# Patient Record
Sex: FEMALE | Race: WHITE
[De-identification: names, ages, dates, MRNs, and addresses within clinical notes are randomized per-mention and may not be internally consistent; named-entity substitution may affect disease eponyms.]

---

## 2019-09-03 ENCOUNTER — HOSPITAL ENCOUNTER (OUTPATIENT)
Dept: HOSPITAL 95 - LAB SRC | Age: 69
Discharge: HOME | End: 2019-09-03
Attending: NURSE PRACTITIONER
Payer: COMMERCIAL

## 2019-09-03 DIAGNOSIS — R31.9: Primary | ICD-10-CM

## 2019-09-03 LAB
LEUKOCYTE ESTERASE UR QL STRIP: (no result)
PROT UR STRIP-MCNC: (no result) MG/DL
RBC #/AREA URNS HPF: (no result) /HPF
SP GR SPEC: 1.01
UROBILINOGEN UR STRIP-MCNC: (no result) MG/DL

## 2019-10-25 ENCOUNTER — HOSPITAL ENCOUNTER (OUTPATIENT)
Dept: HOSPITAL 95 - LAB SHORT | Age: 69
Discharge: HOME | End: 2019-10-25
Attending: NURSE PRACTITIONER
Payer: COMMERCIAL

## 2019-10-25 DIAGNOSIS — R82.998: Primary | ICD-10-CM

## 2019-10-25 LAB
LEUKOCYTE ESTERASE UR QL STRIP: (no result)
RBC #/AREA URNS HPF: (no result) /HPF (ref 0–2)
SP GR SPEC: 1 (ref 1–1.02)
UROBILINOGEN UR STRIP-MCNC: (no result) MG/DL
WBC #/AREA URNS HPF: (no result) /HPF (ref 0–5)

## 2020-02-07 ENCOUNTER — HOSPITAL ENCOUNTER (OUTPATIENT)
Dept: HOSPITAL 95 - LAB SHORT | Age: 70
Discharge: HOME | End: 2020-02-07
Attending: FAMILY MEDICINE
Payer: COMMERCIAL

## 2020-02-07 DIAGNOSIS — R35.0: Primary | ICD-10-CM

## 2020-02-07 DIAGNOSIS — R82.998: ICD-10-CM

## 2020-02-07 LAB
SP GR SPEC: 1.02 (ref 1–1.02)
UROBILINOGEN UR STRIP-MCNC: (no result) MG/DL

## 2020-04-13 ENCOUNTER — HOSPITAL ENCOUNTER (OUTPATIENT)
Dept: HOSPITAL 95 - ORSCMMR | Age: 70
Discharge: HOME | End: 2020-04-13
Attending: SURGERY
Payer: COMMERCIAL

## 2020-04-13 VITALS — WEIGHT: 104.72 LBS | BODY MASS INDEX: 20.56 KG/M2 | HEIGHT: 60 IN

## 2020-04-13 DIAGNOSIS — C77.3: Primary | ICD-10-CM

## 2020-04-13 DIAGNOSIS — Z79.899: ICD-10-CM

## 2020-04-13 DIAGNOSIS — Z85.3: ICD-10-CM

## 2020-04-13 PROCEDURE — 07B60ZX EXCISION OF LEFT AXILLARY LYMPHATIC, OPEN APPROACH, DIAGNOSTIC: ICD-10-PCS | Performed by: SURGERY

## 2020-05-11 ENCOUNTER — HOSPITAL ENCOUNTER (OUTPATIENT)
Dept: HOSPITAL 95 - ORSCMMR | Age: 70
Discharge: HOME | End: 2020-05-11
Attending: SURGERY
Payer: COMMERCIAL

## 2020-05-11 VITALS — WEIGHT: 105.82 LBS | HEIGHT: 60 IN | BODY MASS INDEX: 20.78 KG/M2

## 2020-05-11 DIAGNOSIS — G47.33: ICD-10-CM

## 2020-05-11 DIAGNOSIS — E78.00: ICD-10-CM

## 2020-05-11 DIAGNOSIS — C77.9: Primary | ICD-10-CM

## 2020-05-11 PROCEDURE — 0JH60WZ INSERTION OF TOTALLY IMPLANTABLE VASCULAR ACCESS DEVICE INTO CHEST SUBCUTANEOUS TISSUE AND FASCIA, OPEN APPROACH: ICD-10-PCS | Performed by: SURGERY

## 2020-05-11 PROCEDURE — C1788 PORT, INDWELLING, IMP: HCPCS

## 2020-05-11 NOTE — NUR
DISCHARGE
Patient up to Ambulate independently. Gait steady.
Discharge instructions reviewed with patient. Patient verbalizes understanding.
Copy given to patient to take home.
Dressing to procedure site clean, dry, intact with no visible
drainage, swelling, erythema or bruising noted.
Patient States Post-Procedure ride home has been arranged.
Discharged via wheelchair to private car for ride home.
PT STATES PAIN IS GETTING A LITTLE BETTER PT DENIES N/V HARD COPY OF RX GIVEN
TO PT

## 2020-05-26 ENCOUNTER — HOSPITAL ENCOUNTER (OUTPATIENT)
Dept: HOSPITAL 95 - LAB | Age: 70
Discharge: HOME | End: 2020-05-26
Attending: INTERNAL MEDICINE
Payer: COMMERCIAL

## 2020-05-26 DIAGNOSIS — C50.919: Primary | ICD-10-CM

## 2020-05-26 LAB
BASOPHILS # BLD: 0 K/MM3 (ref 0–0.23)
BASOPHILS NFR BLD: 0 % (ref 0–2)
DEPRECATED RDW RBC AUTO: 43.9 FL (ref 35.1–46.3)
EOSINOPHIL # BLD: 0 K/MM3 (ref 0–0.68)
EOSINOPHIL NFR BLD: 0 % (ref 0–6)
ERYTHROCYTE [DISTWIDTH] IN BLOOD BY AUTOMATED COUNT: 12.8 % (ref 11.7–14.2)
HCT VFR BLD AUTO: 33.1 % (ref 33–51)
HGB BLD-MCNC: 10.7 G/DL (ref 11.5–16)
LYMPHOCYTES # BLD: 0.44 K/MM3 (ref 0.84–5.2)
LYMPHOCYTES NFR BLD: 2 % (ref 21–46)
MCHC RBC AUTO-ENTMCNC: 32.3 G/DL (ref 31.5–36.5)
MCV RBC AUTO: 96 FL (ref 80–100)
METAMYELOCYTES # BLD MANUAL: 0.44 K/MM3 (ref 0–0)
METAMYELOCYTES NFR BLD MANUAL: 2 % (ref 0–0)
MONOCYTES # BLD: 0.66 K/MM3 (ref 0.16–1.47)
MONOCYTES NFR BLD: 3 % (ref 4–13)
MYELOCYTES # BLD MANUAL: 0.44 K/MM3 (ref 0–0)
MYELOCYTES NFR BLD MANUAL: 2 % (ref 0–0)
NEUTS BAND NFR BLD MANUAL: 14 % (ref 0–8)
NEUTS SEG # BLD MANUAL: 20.11 K/MM3 (ref 1.96–9.15)
NEUTS SEG NFR BLD MANUAL: 77 % (ref 41–73)
NRBC # BLD AUTO: 0.08 K/MM3 (ref 0–0.02)
NRBC BLD AUTO-RTO: 0.4 /100 WBC (ref 0–0.2)
PLATELET # BLD AUTO: 249 K/MM3 (ref 150–400)
TOTAL CELLS COUNTED BLD: 100

## 2020-06-17 ENCOUNTER — HOSPITAL ENCOUNTER (OUTPATIENT)
Dept: HOSPITAL 95 - ORSCMMR | Age: 70
Discharge: HOME | End: 2020-06-17
Attending: SURGERY
Payer: COMMERCIAL

## 2020-06-17 VITALS — HEIGHT: 59.02 IN | WEIGHT: 103.4 LBS | BODY MASS INDEX: 20.84 KG/M2

## 2020-06-17 DIAGNOSIS — G47.33: ICD-10-CM

## 2020-06-17 DIAGNOSIS — T82.524A: Primary | ICD-10-CM

## 2020-06-17 DIAGNOSIS — Z85.3: ICD-10-CM

## 2020-06-17 DIAGNOSIS — Z79.899: ICD-10-CM

## 2020-06-17 PROCEDURE — B5131ZA FLUOROSCOPY OF RIGHT JUGULAR VEINS USING LOW OSMOLAR CONTRAST, GUIDANCE: ICD-10-PCS | Performed by: SURGERY

## 2020-06-17 PROCEDURE — 05HM33Z INSERTION OF INFUSION DEVICE INTO RIGHT INTERNAL JUGULAR VEIN, PERCUTANEOUS APPROACH: ICD-10-PCS | Performed by: SURGERY

## 2020-06-17 PROCEDURE — C1788 PORT, INDWELLING, IMP: HCPCS

## 2020-06-17 NOTE — NUR
Ambulatory in Day Surgery.
Surgical site prepped with 2% Chlorhexidine cloth wipe.
History, Chart, Medications and Allergies reviewed before start of
procedure. Lungs clear T/O to Auscultation.
Patient confirms NPO status and agrees with scheduled surgery.
Patient reports completing Chlorhexadine shower X2 prior to admission to
hospital. Pre-Op teaching done. Pt verbalizes understanding.
Patient States Post-Procedure ride home has been arranged.

## 2020-06-17 NOTE — NUR
Dressing to procedure site clean, dry, intact with no visible
drainage, swelling, erythema or bruising noted.
TOLERATED PO WELL.
Patient States Post-Procedure ride home has been arranged.
Discharge instructions reviewed with patient. Patient verbalizes understanding.
Copy given to patient to take home.

## 2020-10-06 ENCOUNTER — HOSPITAL ENCOUNTER (OUTPATIENT)
Dept: HOSPITAL 95 - LAB SHORT | Age: 70
End: 2020-10-06
Attending: PHYSICIAN ASSISTANT
Payer: COMMERCIAL

## 2020-10-06 DIAGNOSIS — L82.1: ICD-10-CM

## 2020-10-06 DIAGNOSIS — D36.12: ICD-10-CM

## 2020-10-06 DIAGNOSIS — D22.61: ICD-10-CM

## 2020-10-06 DIAGNOSIS — D22.72: ICD-10-CM

## 2020-10-06 DIAGNOSIS — D18.01: ICD-10-CM

## 2020-10-06 DIAGNOSIS — D22.5: ICD-10-CM

## 2020-10-06 DIAGNOSIS — L81.4: ICD-10-CM

## 2020-10-06 DIAGNOSIS — L60.9: ICD-10-CM

## 2020-10-06 DIAGNOSIS — Z80.8: ICD-10-CM

## 2020-10-06 DIAGNOSIS — D22.62: ICD-10-CM

## 2020-10-06 DIAGNOSIS — L08.9: Primary | ICD-10-CM

## 2020-10-06 DIAGNOSIS — D22.71: ICD-10-CM

## 2020-10-06 DIAGNOSIS — D36.14: ICD-10-CM

## 2020-10-06 DIAGNOSIS — Z71.89: ICD-10-CM

## 2021-07-02 ENCOUNTER — HOSPITAL ENCOUNTER (OUTPATIENT)
Dept: HOSPITAL 95 - LAB SHORT | Age: 71
Discharge: HOME | End: 2021-07-02
Attending: NURSE PRACTITIONER
Payer: COMMERCIAL

## 2021-07-02 DIAGNOSIS — R19.7: Primary | ICD-10-CM

## 2021-07-02 LAB — C DIFF TOX GENS STL QL NAA+PROBE: NEGATIVE

## 2021-07-27 ENCOUNTER — HOSPITAL ENCOUNTER (OUTPATIENT)
Dept: HOSPITAL 95 - LAB SHORT | Age: 71
End: 2021-07-27
Attending: FAMILY MEDICINE
Payer: COMMERCIAL

## 2021-07-27 DIAGNOSIS — N39.0: Primary | ICD-10-CM

## 2022-03-14 ENCOUNTER — HOSPITAL ENCOUNTER (EMERGENCY)
Dept: HOSPITAL 95 - ER | Age: 72
Discharge: HOME | End: 2022-03-14
Payer: COMMERCIAL

## 2022-03-14 VITALS — HEIGHT: 60 IN | WEIGHT: 105.01 LBS | BODY MASS INDEX: 20.62 KG/M2

## 2022-03-14 DIAGNOSIS — C50.812: ICD-10-CM

## 2022-03-14 DIAGNOSIS — Z20.822: ICD-10-CM

## 2022-03-14 DIAGNOSIS — J90: Primary | ICD-10-CM

## 2022-03-14 DIAGNOSIS — Z88.8: ICD-10-CM

## 2022-03-14 DIAGNOSIS — C50.912: ICD-10-CM

## 2022-03-14 DIAGNOSIS — Z88.5: ICD-10-CM

## 2022-03-14 LAB
FLUAV RNA SPEC QL NAA+PROBE: NEGATIVE
FLUBV RNA SPEC QL NAA+PROBE: NEGATIVE
RSV RNA SPEC QL NAA+PROBE: NEGATIVE
SARS-COV-2 RNA RESP QL NAA+PROBE: NEGATIVE

## 2022-03-14 PROCEDURE — A9270 NON-COVERED ITEM OR SERVICE: HCPCS

## 2022-04-22 ENCOUNTER — HOSPITAL ENCOUNTER (EMERGENCY)
Dept: HOSPITAL 95 - ER | Age: 72
Discharge: HOME | End: 2022-04-22
Payer: COMMERCIAL

## 2022-04-22 VITALS — BODY MASS INDEX: 20.03 KG/M2 | HEIGHT: 60 IN | WEIGHT: 102.01 LBS

## 2022-04-22 DIAGNOSIS — C34.90: ICD-10-CM

## 2022-04-22 DIAGNOSIS — C50.919: ICD-10-CM

## 2022-04-22 DIAGNOSIS — Z79.899: ICD-10-CM

## 2022-04-22 DIAGNOSIS — Z88.5: ICD-10-CM

## 2022-04-22 DIAGNOSIS — Z20.822: ICD-10-CM

## 2022-04-22 DIAGNOSIS — Z88.6: ICD-10-CM

## 2022-04-22 DIAGNOSIS — J90: Primary | ICD-10-CM

## 2022-04-22 LAB
ALBUMIN SERPL BCP-MCNC: 2.7 G/DL (ref 3.4–5)
ALBUMIN/GLOB SERPL: 0.8 {RATIO} (ref 0.8–1.8)
ALT SERPL W P-5'-P-CCNC: 21 U/L (ref 12–78)
ANION GAP SERPL CALCULATED.4IONS-SCNC: 6 MMOL/L (ref 6–16)
AST SERPL W P-5'-P-CCNC: 16 U/L (ref 12–37)
BASOPHILS # BLD: 0.01 K/MM3 (ref 0–0.23)
BASOPHILS NFR BLD: 1 % (ref 0–2)
BILIRUB SERPL-MCNC: 0.6 MG/DL (ref 0.1–1)
BUN SERPL-MCNC: 13 MG/DL (ref 8–24)
CALCIUM SERPL-MCNC: 9 MG/DL (ref 8.5–10.1)
CHLORIDE SERPL-SCNC: 104 MMOL/L (ref 98–108)
CO2 SERPL-SCNC: 28 MMOL/L (ref 21–32)
CREAT SERPL-MCNC: 0.81 MG/DL (ref 0.4–1)
DEPRECATED RDW RBC AUTO: 71 FL (ref 35.1–46.3)
EOSINOPHIL # BLD: 0 K/MM3 (ref 0–0.68)
EOSINOPHIL NFR BLD: 0 % (ref 0–6)
ERYTHROCYTE [DISTWIDTH] IN BLOOD BY AUTOMATED COUNT: 20.6 % (ref 11.7–14.2)
FLUAV RNA SPEC QL NAA+PROBE: NEGATIVE
FLUBV RNA SPEC QL NAA+PROBE: NEGATIVE
GLOBULIN SER CALC-MCNC: 3.6 G/DL (ref 2.2–4)
GLUCOSE SERPL-MCNC: 88 MG/DL (ref 70–99)
HCT VFR BLD AUTO: 33.1 % (ref 33–51)
HGB BLD-MCNC: 11.6 G/DL (ref 11.5–16)
LYMPHOCYTES # BLD: 0.76 K/MM3 (ref 0.84–5.2)
LYMPHOCYTES NFR BLD: 40 % (ref 21–46)
MCHC RBC AUTO-ENTMCNC: 35 G/DL (ref 31.5–36.5)
MCV RBC AUTO: 100 FL (ref 80–100)
MONOCYTES # BLD: 0.03 K/MM3 (ref 0.16–1.47)
MONOCYTES NFR BLD: 2 % (ref 4–13)
NEUTS BAND NFR BLD MANUAL: 1 % (ref 0–8)
NEUTS SEG # BLD MANUAL: 1.08 K/MM3 (ref 1.96–9.15)
NEUTS SEG NFR BLD MANUAL: 56 % (ref 41–73)
NRBC # BLD AUTO: 0 K/MM3 (ref 0–0.02)
NRBC BLD AUTO-RTO: 0 /100 WBC (ref 0–0.2)
PLATELET # BLD AUTO: 196 K/MM3 (ref 150–400)
POTASSIUM SERPL-SCNC: 4 MMOL/L (ref 3.5–5.5)
PROT SERPL-MCNC: 6.3 G/DL (ref 6.4–8.2)
PROTHROMBIN TIME: 10.9 SEC (ref 9.7–11.5)
RSV RNA SPEC QL NAA+PROBE: NEGATIVE
SARS-COV-2 RNA RESP QL NAA+PROBE: NEGATIVE
SODIUM SERPL-SCNC: 138 MMOL/L (ref 136–145)
TOTAL CELLS COUNTED BLD: 100

## 2022-04-26 ENCOUNTER — HOSPITAL ENCOUNTER (OUTPATIENT)
Dept: HOSPITAL 95 - LAB SHORT | Age: 72
Discharge: HOME | End: 2022-04-26
Attending: FAMILY MEDICINE
Payer: COMMERCIAL

## 2022-04-26 DIAGNOSIS — R30.9: ICD-10-CM

## 2022-04-26 DIAGNOSIS — M54.50: Primary | ICD-10-CM

## 2022-05-12 ENCOUNTER — HOSPITAL ENCOUNTER (OUTPATIENT)
Dept: HOSPITAL 95 - ORSCMMR | Age: 72
Discharge: HOME | End: 2022-05-12
Attending: SURGERY
Payer: COMMERCIAL

## 2022-05-12 VITALS — WEIGHT: 99.87 LBS | HEIGHT: 60 IN | BODY MASS INDEX: 19.61 KG/M2

## 2022-05-12 DIAGNOSIS — E78.00: ICD-10-CM

## 2022-05-12 DIAGNOSIS — J91.0: ICD-10-CM

## 2022-05-12 DIAGNOSIS — Z78.9: ICD-10-CM

## 2022-05-12 DIAGNOSIS — G47.33: ICD-10-CM

## 2022-05-12 DIAGNOSIS — Z99.81: ICD-10-CM

## 2022-05-12 DIAGNOSIS — C50.912: Primary | ICD-10-CM

## 2022-05-12 DIAGNOSIS — Z79.899: ICD-10-CM

## 2022-05-12 PROCEDURE — 0JH60WZ INSERTION OF TOTALLY IMPLANTABLE VASCULAR ACCESS DEVICE INTO CHEST SUBCUTANEOUS TISSUE AND FASCIA, OPEN APPROACH: ICD-10-PCS | Performed by: SURGERY

## 2022-05-12 PROCEDURE — C1729 CATH, DRAINAGE: HCPCS

## 2022-05-12 PROCEDURE — C1788 PORT, INDWELLING, IMP: HCPCS

## 2022-05-12 PROCEDURE — 0W9B30Z DRAINAGE OF LEFT PLEURAL CAVITY WITH DRAINAGE DEVICE, PERCUTANEOUS APPROACH: ICD-10-PCS | Performed by: SURGERY

## 2022-05-12 NOTE — NUR
Pt in OR getting a pleurex drain placed. Pt had left lung pleurex placed.
Leela realys more shortness of breath and difficulty getting in for
treatment. Review of pleurex drain with  and pt. Pt does not have home
health set up. Review of all three agencies and the need for early intake. [facility]
would like [facility] and if not available who ever come. She has a friend who used
to work for [facility].
  [facility]ed [facility] they are not taking appointments at this time. [facility] is
three weeks out. Called dr sue office to review and son [facility] program is
ok. Review of pt with [facility] intake nurse. If pt fails this last medication she
will need hospice. [facility] will see pt for symptom managment and pleurex
managment.
  Advised that if [facility] cannot manage need to schedule appointment at infusion
clinic and Staff from clinic will drain pt. And we will repeat teaching. will
follow up with pt and  packet faxed. Advised Dr resendiz office to fax
information they will need to provide perscription for volume and frequency
since pt not seen at surgeons office.

## 2022-05-12 NOTE — NUR
PT AXOX4, ABLE TO REPOSITION SELF IN BED. REQUESTING PO FLUIDS AND FOOD. PT
STATES 1/10 ACHING PAIN IN R NECK OVER INCISION SITE. PATIENT HAS THREE
INCISION SITES, ONE ON NECK THAT IS COVERED WITH TWO STERI STRIPS THAT ARE DRY
AND INTACT WITH SCANT AMOUNT OF DRY SANGUINEOUS MATERIAL. ONE DRESSING OVER
SECOND PROCEDURE SITE (MEDIPORT) ON RIGHT CHEST THAT IS 2X2 GAUZE COVERED WITH
CLEAR WINDOW TAPE AND IT IS CLEAN, DRY AND INTACT. ONE MORE INCISION SITE IS
ON LEFT ABDOMEN FOR PLEURX CATHETER, HAS FOAM DRESSING WITH WINDOW TAPE AND IS
CLEAN, DRY AND INTACT.

## 2022-05-12 NOTE — NUR
PT IS RESTING COMFORTABLY IN BED TOLERATING PO FLUIDS AND FOOD. PT CAME TO
STEPDOWN ON 4 L O2 NASAL CANNULA. OXYGEN SATS SUSTAIN IN MID 90'S WHILE
PATIENT HAS NASAL CANNULA ON, DROPS TO MID 80'S IF SHE REMOVES IT AFTER ONE
MINUTE.

## 2022-05-12 NOTE — NUR
PT RESTING IN BED, VISITING WITH  AT BEDSIDE. PT IS AXOX4, ABLE TO
REPOSITION IN BED. PATIENTS LUNGS ARE CLEAR BILATERALLY, LEFT LUNG IS
DIMINISHED THROUGHOUT.

## 2022-05-24 ENCOUNTER — HOSPITAL ENCOUNTER (OUTPATIENT)
Dept: HOSPITAL 95 - ORSCSDS | Age: 72
Discharge: HOME | End: 2022-05-24
Attending: SURGERY
Payer: COMMERCIAL

## 2022-05-24 VITALS — WEIGHT: 98.55 LBS | BODY MASS INDEX: 19.35 KG/M2 | HEIGHT: 60 IN

## 2022-05-24 DIAGNOSIS — J91.0: ICD-10-CM

## 2022-05-24 DIAGNOSIS — C50.919: Primary | ICD-10-CM

## 2022-05-24 DIAGNOSIS — Z79.899: ICD-10-CM

## 2022-05-24 DIAGNOSIS — G47.33: ICD-10-CM

## 2022-05-24 DIAGNOSIS — Z99.81: ICD-10-CM

## 2022-05-24 PROCEDURE — 0W9B30Z DRAINAGE OF LEFT PLEURAL CAVITY WITH DRAINAGE DEVICE, PERCUTANEOUS APPROACH: ICD-10-PCS | Performed by: SURGERY

## 2022-05-24 PROCEDURE — 0W9930Z DRAINAGE OF RIGHT PLEURAL CAVITY WITH DRAINAGE DEVICE, PERCUTANEOUS APPROACH: ICD-10-PCS | Performed by: SURGERY

## 2022-05-24 PROCEDURE — 0WPB30Z REMOVAL OF DRAINAGE DEVICE FROM LEFT PLEURAL CAVITY, PERCUTANEOUS APPROACH: ICD-10-PCS | Performed by: SURGERY

## 2022-05-24 PROCEDURE — C1729 CATH, DRAINAGE: HCPCS

## 2022-05-24 NOTE — NUR
05/24/22 1240 Malka Simpson
PT IN THE RECLINER WITH LEGS ELEVATED.  AT CHAIRSIDE. VSS. PT
DENIES PAIN AND NAUSEA AT THIS TIME. PT TOLERATING PO FLUIDS WELL.
CARE TURNED OVER TO NURSE ALONZO.

## 2022-05-24 NOTE — NUR
05/24/22 Juan Manuel9 Phyllis Slade
LEFT CATH SIDE DRESSED AND INTACT TO LEFT ABDOMEN.  DRESSING REMOVED
AND SITE PREPPED PER SURGEON FOR REPOSITIONING.  SITE WITHOUT S/S OF
INFECTION.

## 2022-05-31 ENCOUNTER — HOSPITAL ENCOUNTER (OUTPATIENT)
Dept: HOSPITAL 95 - LAB SHORT | Age: 72
End: 2022-05-31
Attending: INTERNAL MEDICINE
Payer: COMMERCIAL

## 2022-05-31 DIAGNOSIS — C50.919: Primary | ICD-10-CM

## 2022-05-31 LAB
ALBUMIN SERPL BCP-MCNC: 2.1 G/DL (ref 3.4–5)
ALBUMIN/GLOB SERPL: 0.6 {RATIO} (ref 0.8–1.8)
ALT SERPL W P-5'-P-CCNC: 17 U/L (ref 12–78)
ANION GAP SERPL CALCULATED.4IONS-SCNC: 15 MMOL/L (ref 6–16)
AST SERPL W P-5'-P-CCNC: 21 U/L (ref 12–37)
BASOPHILS # BLD: 0.03 K/MM3 (ref 0–0.23)
BASOPHILS NFR BLD: 1 % (ref 0–2)
BILIRUB SERPL-MCNC: 0.7 MG/DL (ref 0.1–1)
BUN SERPL-MCNC: 15 MG/DL (ref 8–24)
CALCIUM SERPL-MCNC: 8.7 MG/DL (ref 8.5–10.1)
CHLORIDE SERPL-SCNC: 100 MMOL/L (ref 98–108)
CO2 SERPL-SCNC: 20 MMOL/L (ref 21–32)
CREAT SERPL-MCNC: 0.61 MG/DL (ref 0.4–1)
DEPRECATED RDW RBC AUTO: 60.3 FL (ref 35.1–46.3)
EOSINOPHIL # BLD: 0.03 K/MM3 (ref 0–0.68)
EOSINOPHIL NFR BLD: 1 % (ref 0–6)
ERYTHROCYTE [DISTWIDTH] IN BLOOD BY AUTOMATED COUNT: 15.5 % (ref 11.7–14.2)
GLOBULIN SER CALC-MCNC: 3.3 G/DL (ref 2.2–4)
GLUCOSE SERPL-MCNC: 119 MG/DL (ref 70–99)
HCT VFR BLD AUTO: 23.1 % (ref 33–51)
HGB BLD-MCNC: 7.9 G/DL (ref 11.5–16)
LYMPHOCYTES # BLD: 0.82 K/MM3 (ref 0.84–5.2)
LYMPHOCYTES NFR BLD: 25 % (ref 21–46)
MCHC RBC AUTO-ENTMCNC: 34.2 G/DL (ref 31.5–36.5)
MCV RBC AUTO: 106 FL (ref 80–100)
MONOCYTES # BLD: 0.32 K/MM3 (ref 0.16–1.47)
MONOCYTES NFR BLD: 10 % (ref 4–13)
NEUTS BAND NFR BLD MANUAL: 8 % (ref 0–8)
NEUTS SEG # BLD MANUAL: 2.07 K/MM3 (ref 1.96–9.15)
NEUTS SEG NFR BLD MANUAL: 55 % (ref 41–73)
NRBC # BLD AUTO: 0 K/MM3 (ref 0–0.02)
NRBC BLD AUTO-RTO: 0 /100 WBC (ref 0–0.2)
PLATELET # BLD AUTO: 209 K/MM3 (ref 150–400)
POTASSIUM SERPL-SCNC: 3.3 MMOL/L (ref 3.5–5.5)
PROT SERPL-MCNC: 5.4 G/DL (ref 6.4–8.2)
SODIUM SERPL-SCNC: 135 MMOL/L (ref 136–145)
TOTAL CELLS COUNTED BLD: 100

## 2022-06-07 ENCOUNTER — HOSPITAL ENCOUNTER (OUTPATIENT)
Dept: HOSPITAL 95 - ATC | Age: 72
Discharge: HOME | End: 2022-06-07
Attending: INTERNAL MEDICINE
Payer: COMMERCIAL

## 2022-06-07 DIAGNOSIS — C77.3: ICD-10-CM

## 2022-06-07 DIAGNOSIS — C77.1: ICD-10-CM

## 2022-06-07 DIAGNOSIS — C50.912: Primary | ICD-10-CM

## 2022-06-07 DIAGNOSIS — Z45.2: ICD-10-CM

## 2022-06-07 DIAGNOSIS — C79.70: ICD-10-CM

## 2022-06-07 LAB
ALBUMIN SERPL BCP-MCNC: 2 G/DL (ref 3.4–5)
ALBUMIN/GLOB SERPL: 0.6 {RATIO} (ref 0.8–1.8)
ALT SERPL W P-5'-P-CCNC: 15 U/L (ref 12–78)
ANION GAP SERPL CALCULATED.4IONS-SCNC: 12 MMOL/L (ref 6–16)
AST SERPL W P-5'-P-CCNC: 19 U/L (ref 12–37)
BASOPHILS # BLD AUTO: 0.07 K/MM3 (ref 0–0.23)
BASOPHILS NFR BLD AUTO: 1 % (ref 0–2)
BILIRUB SERPL-MCNC: 0.4 MG/DL (ref 0.1–1)
BUN SERPL-MCNC: 12 MG/DL (ref 8–24)
CALCIUM SERPL-MCNC: 8.8 MG/DL (ref 8.5–10.1)
CHLORIDE SERPL-SCNC: 100 MMOL/L (ref 98–108)
CO2 SERPL-SCNC: 23 MMOL/L (ref 21–32)
CREAT SERPL-MCNC: 0.49 MG/DL (ref 0.4–1)
DEPRECATED RDW RBC AUTO: 59.4 FL (ref 35.1–46.3)
EOSINOPHIL # BLD AUTO: 0.03 K/MM3 (ref 0–0.68)
EOSINOPHIL NFR BLD AUTO: 1 % (ref 0–6)
ERYTHROCYTE [DISTWIDTH] IN BLOOD BY AUTOMATED COUNT: 15.2 % (ref 11.7–14.2)
GLOBULIN SER CALC-MCNC: 3.3 G/DL (ref 2.2–4)
GLUCOSE SERPL-MCNC: 88 MG/DL (ref 70–99)
HCT VFR BLD AUTO: 33.5 % (ref 33–51)
HGB BLD-MCNC: 11.3 G/DL (ref 11.5–16)
IMM GRANULOCYTES # BLD AUTO: 0.07 K/MM3 (ref 0–0.1)
IMM GRANULOCYTES NFR BLD AUTO: 1 % (ref 0–1)
LYMPHOCYTES # BLD AUTO: 0.79 K/MM3 (ref 0.84–5.2)
LYMPHOCYTES NFR BLD AUTO: 16 % (ref 21–46)
MCHC RBC AUTO-ENTMCNC: 33.7 G/DL (ref 31.5–36.5)
MCV RBC AUTO: 107 FL (ref 80–100)
MONOCYTES # BLD AUTO: 0.45 K/MM3 (ref 0.16–1.47)
MONOCYTES NFR BLD AUTO: 9 % (ref 4–13)
NEUTROPHILS # BLD AUTO: 3.66 K/MM3 (ref 1.96–9.15)
NEUTROPHILS NFR BLD AUTO: 72 % (ref 41–73)
NRBC # BLD AUTO: 0 K/MM3 (ref 0–0.02)
NRBC BLD AUTO-RTO: 0 /100 WBC (ref 0–0.2)
PLATELET # BLD AUTO: 236 K/MM3 (ref 150–400)
POTASSIUM SERPL-SCNC: 3.9 MMOL/L (ref 3.5–5.5)
PROT SERPL-MCNC: 5.3 G/DL (ref 6.4–8.2)
SODIUM SERPL-SCNC: 135 MMOL/L (ref 136–145)

## 2022-06-20 ENCOUNTER — HOSPITAL ENCOUNTER (OUTPATIENT)
Dept: HOSPITAL 95 - ATC | Age: 72
Discharge: HOME | End: 2022-06-20
Attending: INTERNAL MEDICINE
Payer: COMMERCIAL

## 2022-06-20 DIAGNOSIS — Z17.0: ICD-10-CM

## 2022-06-20 DIAGNOSIS — Z88.5: ICD-10-CM

## 2022-06-20 DIAGNOSIS — Z88.6: ICD-10-CM

## 2022-06-20 DIAGNOSIS — M81.0: ICD-10-CM

## 2022-06-20 DIAGNOSIS — C79.70: ICD-10-CM

## 2022-06-20 DIAGNOSIS — C50.912: Primary | ICD-10-CM

## 2022-06-20 DIAGNOSIS — C77.1: ICD-10-CM

## 2022-06-20 DIAGNOSIS — C77.3: ICD-10-CM

## 2022-06-20 LAB
ALBUMIN SERPL BCP-MCNC: 1.9 G/DL (ref 3.4–5)
ALBUMIN/GLOB SERPL: 0.6 {RATIO} (ref 0.8–1.8)
ALT SERPL W P-5'-P-CCNC: 13 U/L (ref 12–78)
ANION GAP SERPL CALCULATED.4IONS-SCNC: 8 MMOL/L (ref 6–16)
AST SERPL W P-5'-P-CCNC: 13 U/L (ref 12–37)
BASOPHILS # BLD AUTO: 0.01 K/MM3 (ref 0–0.23)
BASOPHILS NFR BLD AUTO: 1 % (ref 0–2)
BILIRUB SERPL-MCNC: 0.4 MG/DL (ref 0.1–1)
BUN SERPL-MCNC: 11 MG/DL (ref 8–24)
CALCIUM SERPL-MCNC: 8.3 MG/DL (ref 8.5–10.1)
CHLORIDE SERPL-SCNC: 104 MMOL/L (ref 98–108)
CO2 SERPL-SCNC: 27 MMOL/L (ref 21–32)
CREAT SERPL-MCNC: 0.41 MG/DL (ref 0.4–1)
DEPRECATED RDW RBC AUTO: 55.5 FL (ref 35.1–46.3)
EOSINOPHIL # BLD AUTO: 0.02 K/MM3 (ref 0–0.68)
EOSINOPHIL NFR BLD AUTO: 1 % (ref 0–6)
ERYTHROCYTE [DISTWIDTH] IN BLOOD BY AUTOMATED COUNT: 14.5 % (ref 11.7–14.2)
GLOBULIN SER CALC-MCNC: 3.4 G/DL (ref 2.2–4)
GLUCOSE SERPL-MCNC: 114 MG/DL (ref 70–99)
HCT VFR BLD AUTO: 28.8 % (ref 33–51)
HGB BLD-MCNC: 9.7 G/DL (ref 11.5–16)
IMM GRANULOCYTES # BLD AUTO: 0 K/MM3 (ref 0–0.1)
IMM GRANULOCYTES NFR BLD AUTO: 0 % (ref 0–1)
LYMPHOCYTES # BLD AUTO: 0.72 K/MM3 (ref 0.84–5.2)
LYMPHOCYTES NFR BLD AUTO: 46 % (ref 21–46)
MCHC RBC AUTO-ENTMCNC: 33.7 G/DL (ref 31.5–36.5)
MCV RBC AUTO: 104 FL (ref 80–100)
MONOCYTES # BLD AUTO: 0.34 K/MM3 (ref 0.16–1.47)
MONOCYTES NFR BLD AUTO: 22 % (ref 4–13)
NEUTROPHILS # BLD AUTO: 0.47 K/MM3 (ref 1.96–9.15)
NEUTROPHILS NFR BLD AUTO: 30 % (ref 41–73)
NRBC # BLD AUTO: 0 K/MM3 (ref 0–0.02)
NRBC BLD AUTO-RTO: 0 /100 WBC (ref 0–0.2)
PLATELET # BLD AUTO: 148 K/MM3 (ref 150–400)
POTASSIUM SERPL-SCNC: 3.4 MMOL/L (ref 3.5–5.5)
PROT SERPL-MCNC: 5.3 G/DL (ref 6.4–8.2)
SODIUM SERPL-SCNC: 139 MMOL/L (ref 136–145)

## 2022-06-27 ENCOUNTER — HOSPITAL ENCOUNTER (OUTPATIENT)
Dept: HOSPITAL 95 - ATC | Age: 72
Discharge: HOME | End: 2022-06-27
Attending: INTERNAL MEDICINE
Payer: COMMERCIAL

## 2022-06-27 DIAGNOSIS — C50.912: Primary | ICD-10-CM

## 2022-06-27 LAB
ALBUMIN SERPL BCP-MCNC: 1.8 G/DL (ref 3.4–5)
ALBUMIN/GLOB SERPL: 0.6 {RATIO} (ref 0.8–1.8)
ALT SERPL W P-5'-P-CCNC: 8 U/L (ref 12–78)
ANION GAP SERPL CALCULATED.4IONS-SCNC: 6 MMOL/L (ref 6–16)
AST SERPL W P-5'-P-CCNC: 13 U/L (ref 12–37)
BASOPHILS # BLD AUTO: 0.03 K/MM3 (ref 0–0.23)
BASOPHILS NFR BLD AUTO: 0 % (ref 0–2)
BILIRUB SERPL-MCNC: 0.3 MG/DL (ref 0.1–1)
BUN SERPL-MCNC: 9 MG/DL (ref 8–24)
CALCIUM SERPL-MCNC: 8.2 MG/DL (ref 8.5–10.1)
CHLORIDE SERPL-SCNC: 104 MMOL/L (ref 98–108)
CO2 SERPL-SCNC: 27 MMOL/L (ref 21–32)
CREAT SERPL-MCNC: 0.38 MG/DL (ref 0.4–1)
DEPRECATED RDW RBC AUTO: 58.1 FL (ref 35.1–46.3)
EOSINOPHIL # BLD AUTO: 0.02 K/MM3 (ref 0–0.68)
EOSINOPHIL NFR BLD AUTO: 0 % (ref 0–6)
ERYTHROCYTE [DISTWIDTH] IN BLOOD BY AUTOMATED COUNT: 15 % (ref 11.7–14.2)
GLOBULIN SER CALC-MCNC: 3.1 G/DL (ref 2.2–4)
GLUCOSE SERPL-MCNC: 116 MG/DL (ref 70–99)
HCT VFR BLD AUTO: 29 % (ref 33–51)
HGB BLD-MCNC: 9.8 G/DL (ref 11.5–16)
IMM GRANULOCYTES # BLD AUTO: 0.09 K/MM3 (ref 0–0.1)
IMM GRANULOCYTES NFR BLD AUTO: 1 % (ref 0–1)
LYMPHOCYTES # BLD AUTO: 0.88 K/MM3 (ref 0.84–5.2)
LYMPHOCYTES NFR BLD AUTO: 11 % (ref 21–46)
MCHC RBC AUTO-ENTMCNC: 33.8 G/DL (ref 31.5–36.5)
MCV RBC AUTO: 104 FL (ref 80–100)
MONOCYTES # BLD AUTO: 0.72 K/MM3 (ref 0.16–1.47)
MONOCYTES NFR BLD AUTO: 9 % (ref 4–13)
NEUTROPHILS # BLD AUTO: 5.95 K/MM3 (ref 1.96–9.15)
NEUTROPHILS NFR BLD AUTO: 77 % (ref 41–73)
NRBC # BLD AUTO: 0 K/MM3 (ref 0–0.02)
NRBC BLD AUTO-RTO: 0 /100 WBC (ref 0–0.2)
PLATELET # BLD AUTO: 142 K/MM3 (ref 150–400)
POTASSIUM SERPL-SCNC: 3.3 MMOL/L (ref 3.5–5.5)
PROT SERPL-MCNC: 4.9 G/DL (ref 6.4–8.2)
SODIUM SERPL-SCNC: 137 MMOL/L (ref 136–145)

## 2022-07-11 ENCOUNTER — HOSPITAL ENCOUNTER (OUTPATIENT)
Dept: HOSPITAL 95 - ATC | Age: 72
Discharge: HOME | End: 2022-07-11
Attending: INTERNAL MEDICINE
Payer: COMMERCIAL

## 2022-07-11 DIAGNOSIS — C50.912: Primary | ICD-10-CM

## 2022-07-11 LAB
ALBUMIN SERPL BCP-MCNC: 1.9 G/DL (ref 3.4–5)
ALBUMIN/GLOB SERPL: 0.6 {RATIO} (ref 0.8–1.8)
ALT SERPL W P-5'-P-CCNC: 15 U/L (ref 12–78)
ANION GAP SERPL CALCULATED.4IONS-SCNC: 5 MMOL/L (ref 6–16)
AST SERPL W P-5'-P-CCNC: 19 U/L (ref 12–37)
BASOPHILS # BLD: 0.14 K/MM3 (ref 0–0.23)
BASOPHILS NFR BLD: 1 % (ref 0–2)
BILIRUB SERPL-MCNC: 0.2 MG/DL (ref 0.1–1)
BUN SERPL-MCNC: 9 MG/DL (ref 8–24)
CALCIUM SERPL-MCNC: 8.6 MG/DL (ref 8.5–10.1)
CHLORIDE SERPL-SCNC: 107 MMOL/L (ref 98–108)
CO2 SERPL-SCNC: 28 MMOL/L (ref 21–32)
CREAT SERPL-MCNC: 0.38 MG/DL (ref 0.4–1)
DEPRECATED RDW RBC AUTO: 62.4 FL (ref 35.1–46.3)
EOSINOPHIL # BLD: 0 K/MM3 (ref 0–0.68)
EOSINOPHIL NFR BLD: 0 % (ref 0–6)
ERYTHROCYTE [DISTWIDTH] IN BLOOD BY AUTOMATED COUNT: 16.2 % (ref 11.7–14.2)
GLOBULIN SER CALC-MCNC: 3.1 G/DL (ref 2.2–4)
GLUCOSE SERPL-MCNC: 118 MG/DL (ref 70–99)
HCT VFR BLD AUTO: 26.2 % (ref 33–51)
HGB BLD-MCNC: 8.4 G/DL (ref 11.5–16)
LYMPHOCYTES # BLD: 0.44 K/MM3 (ref 0.84–5.2)
LYMPHOCYTES NFR BLD: 3 % (ref 21–46)
MCHC RBC AUTO-ENTMCNC: 32.1 G/DL (ref 31.5–36.5)
MCV RBC AUTO: 106 FL (ref 80–100)
MONOCYTES # BLD: 0.59 K/MM3 (ref 0.16–1.47)
MONOCYTES NFR BLD: 4 % (ref 4–13)
MYELOCYTES # BLD MANUAL: 0.14 K/MM3 (ref 0–0)
MYELOCYTES NFR BLD MANUAL: 1 % (ref 0–0)
NEUTS BAND NFR BLD MANUAL: 10 % (ref 0–8)
NEUTS SEG # BLD MANUAL: 13.63 K/MM3 (ref 1.96–9.15)
NEUTS SEG NFR BLD MANUAL: 81 % (ref 41–73)
NRBC # BLD AUTO: 0.03 K/MM3 (ref 0–0.02)
NRBC BLD AUTO-RTO: 0.2 /100 WBC (ref 0–0.2)
PLATELET # BLD AUTO: 162 K/MM3 (ref 150–400)
POTASSIUM SERPL-SCNC: 3.7 MMOL/L (ref 3.5–5.5)
PROT SERPL-MCNC: 5 G/DL (ref 6.4–8.2)
SODIUM SERPL-SCNC: 140 MMOL/L (ref 136–145)
TOTAL CELLS COUNTED BLD: 100

## 2022-07-19 ENCOUNTER — HOSPITAL ENCOUNTER (INPATIENT)
Dept: HOSPITAL 95 - ER | Age: 72
LOS: 6 days | Discharge: HOME HEALTH | DRG: 314 | End: 2022-07-25
Attending: INTERNAL MEDICINE | Admitting: HOSPITALIST
Payer: COMMERCIAL

## 2022-07-19 VITALS — BODY MASS INDEX: 18.27 KG/M2 | WEIGHT: 93.04 LBS | HEIGHT: 60 IN

## 2022-07-19 DIAGNOSIS — C78.02: ICD-10-CM

## 2022-07-19 DIAGNOSIS — K44.9: ICD-10-CM

## 2022-07-19 DIAGNOSIS — M48.061: ICD-10-CM

## 2022-07-19 DIAGNOSIS — T82.7XXA: Primary | ICD-10-CM

## 2022-07-19 DIAGNOSIS — Z95.828: ICD-10-CM

## 2022-07-19 DIAGNOSIS — G92.8: ICD-10-CM

## 2022-07-19 DIAGNOSIS — C50.912: ICD-10-CM

## 2022-07-19 DIAGNOSIS — Q85.00: ICD-10-CM

## 2022-07-19 DIAGNOSIS — Z79.899: ICD-10-CM

## 2022-07-19 DIAGNOSIS — Z90.12: ICD-10-CM

## 2022-07-19 DIAGNOSIS — D63.8: ICD-10-CM

## 2022-07-19 DIAGNOSIS — Z88.5: ICD-10-CM

## 2022-07-19 DIAGNOSIS — D69.6: ICD-10-CM

## 2022-07-19 DIAGNOSIS — Z79.891: ICD-10-CM

## 2022-07-19 DIAGNOSIS — M81.0: ICD-10-CM

## 2022-07-19 DIAGNOSIS — I31.3: ICD-10-CM

## 2022-07-19 DIAGNOSIS — Z85.72: ICD-10-CM

## 2022-07-19 DIAGNOSIS — J18.9: ICD-10-CM

## 2022-07-19 DIAGNOSIS — E43: ICD-10-CM

## 2022-07-19 DIAGNOSIS — A41.1: ICD-10-CM

## 2022-07-19 DIAGNOSIS — Z88.8: ICD-10-CM

## 2022-07-19 DIAGNOSIS — J43.9: ICD-10-CM

## 2022-07-19 DIAGNOSIS — C78.01: ICD-10-CM

## 2022-07-19 DIAGNOSIS — G47.30: ICD-10-CM

## 2022-07-19 DIAGNOSIS — Z20.822: ICD-10-CM

## 2022-07-19 DIAGNOSIS — Z85.028: ICD-10-CM

## 2022-07-19 DIAGNOSIS — G89.29: ICD-10-CM

## 2022-07-19 DIAGNOSIS — J91.0: ICD-10-CM

## 2022-07-19 DIAGNOSIS — M54.50: ICD-10-CM

## 2022-07-19 DIAGNOSIS — A41.02: ICD-10-CM

## 2022-07-19 DIAGNOSIS — Z92.21: ICD-10-CM

## 2022-07-19 DIAGNOSIS — E87.1: ICD-10-CM

## 2022-07-19 DIAGNOSIS — Z79.52: ICD-10-CM

## 2022-07-19 DIAGNOSIS — M41.86: ICD-10-CM

## 2022-07-19 DIAGNOSIS — Z98.890: ICD-10-CM

## 2022-07-19 DIAGNOSIS — Z66: ICD-10-CM

## 2022-07-19 DIAGNOSIS — E78.00: ICD-10-CM

## 2022-07-19 LAB
ALBUMIN SERPL BCP-MCNC: 2.3 G/DL (ref 3.4–5)
ALBUMIN/GLOB SERPL: 0.5 {RATIO} (ref 0.8–1.8)
ALT SERPL W P-5'-P-CCNC: 14 U/L (ref 12–78)
ANION GAP SERPL CALCULATED.4IONS-SCNC: 9 MMOL/L (ref 6–16)
AST SERPL W P-5'-P-CCNC: 5 U/L (ref 12–37)
BASOPHILS # BLD: 0 K/MM3 (ref 0–0.23)
BASOPHILS NFR BLD: 0 % (ref 0–2)
BILIRUB SERPL-MCNC: 0.4 MG/DL (ref 0.1–1)
BUN SERPL-MCNC: 12 MG/DL (ref 8–24)
CALCIUM SERPL-MCNC: 9 MG/DL (ref 8.5–10.1)
CHLORIDE SERPL-SCNC: 101 MMOL/L (ref 98–108)
CO2 SERPL-SCNC: 24 MMOL/L (ref 21–32)
CREAT SERPL-MCNC: 0.44 MG/DL (ref 0.4–1)
DEPRECATED RDW RBC AUTO: 63.7 FL (ref 35.1–46.3)
EOSINOPHIL # BLD: 0 K/MM3 (ref 0–0.68)
EOSINOPHIL NFR BLD: 0 % (ref 0–6)
ERYTHROCYTE [DISTWIDTH] IN BLOOD BY AUTOMATED COUNT: 17 % (ref 11.7–14.2)
GLOBULIN SER CALC-MCNC: 4.7 G/DL (ref 2.2–4)
GLUCOSE SERPL-MCNC: 111 MG/DL (ref 70–99)
HCT VFR BLD AUTO: 30.4 % (ref 33–51)
HGB BLD-MCNC: 9.8 G/DL (ref 11.5–16)
LYMPHOCYTES # BLD: 1.71 K/MM3 (ref 0.84–5.2)
LYMPHOCYTES NFR BLD: 4 % (ref 21–46)
MCHC RBC AUTO-ENTMCNC: 32.2 G/DL (ref 31.5–36.5)
MCV RBC AUTO: 103 FL (ref 80–100)
MONOCYTES # BLD: 3.43 K/MM3 (ref 0.16–1.47)
MONOCYTES NFR BLD: 8 % (ref 4–13)
MYELOCYTES # BLD MANUAL: 0.85 K/MM3 (ref 0–0)
MYELOCYTES NFR BLD MANUAL: 2 % (ref 0–0)
NEUTS BAND NFR BLD MANUAL: 7 % (ref 0–8)
NEUTS SEG # BLD MANUAL: 36.87 K/MM3 (ref 1.96–9.15)
NEUTS SEG NFR BLD MANUAL: 79 % (ref 41–73)
NRBC # BLD AUTO: 0.15 K/MM3 (ref 0–0.02)
NRBC BLD AUTO-RTO: 0.3 /100 WBC (ref 0–0.2)
PLATELET # BLD AUTO: 146 K/MM3 (ref 150–400)
POTASSIUM SERPL-SCNC: 3.8 MMOL/L (ref 3.5–5.5)
PROT SERPL-MCNC: 7 G/DL (ref 6.4–8.2)
SODIUM SERPL-SCNC: 134 MMOL/L (ref 136–145)
TOTAL CELLS COUNTED BLD: 100

## 2022-07-19 PROCEDURE — 3E03329 INTRODUCTION OF OTHER ANTI-INFECTIVE INTO PERIPHERAL VEIN, PERCUTANEOUS APPROACH: ICD-10-PCS | Performed by: INTERNAL MEDICINE

## 2022-07-19 PROCEDURE — P9016 RBC LEUKOCYTES REDUCED: HCPCS

## 2022-07-19 PROCEDURE — A9270 NON-COVERED ITEM OR SERVICE: HCPCS

## 2022-07-20 LAB
ANION GAP SERPL CALCULATED.4IONS-SCNC: 8 MMOL/L (ref 6–16)
BASOPHILS # BLD: 0 K/MM3 (ref 0–0.23)
BASOPHILS # BLD: 0 K/MM3 (ref 0–0.23)
BASOPHILS NFR BLD: 0 % (ref 0–2)
BASOPHILS NFR BLD: 0 % (ref 0–2)
BUN SERPL-MCNC: 10 MG/DL (ref 8–24)
CALCIUM SERPL-MCNC: 7.8 MG/DL (ref 8.5–10.1)
CHLORIDE SERPL-SCNC: 104 MMOL/L (ref 98–108)
CO2 SERPL-SCNC: 25 MMOL/L (ref 21–32)
CREAT SERPL-MCNC: 0.4 MG/DL (ref 0.4–1)
DEPRECATED RDW RBC AUTO: 63.5 FL (ref 35.1–46.3)
DEPRECATED RDW RBC AUTO: 63.7 FL (ref 35.1–46.3)
EOSINOPHIL # BLD: 0 K/MM3 (ref 0–0.68)
EOSINOPHIL # BLD: 0 K/MM3 (ref 0–0.68)
EOSINOPHIL NFR BLD: 0 % (ref 0–6)
EOSINOPHIL NFR BLD: 0 % (ref 0–6)
ERYTHROCYTE [DISTWIDTH] IN BLOOD BY AUTOMATED COUNT: 17 % (ref 11.7–14.2)
ERYTHROCYTE [DISTWIDTH] IN BLOOD BY AUTOMATED COUNT: 17 % (ref 11.7–14.2)
FLUAV RNA SPEC QL NAA+PROBE: NEGATIVE
FLUBV RNA SPEC QL NAA+PROBE: NEGATIVE
GLUCOSE SERPL-MCNC: 114 MG/DL (ref 70–99)
HCT VFR BLD AUTO: 22.1 % (ref 33–51)
HCT VFR BLD AUTO: 22.9 % (ref 33–51)
HGB BLD-MCNC: 7.1 G/DL (ref 11.5–16)
HGB BLD-MCNC: 7.3 G/DL (ref 11.5–16)
KETONES UR STRIP-MCNC: (no result) MG/DL
LDH SERPL-CCNC: 270 U/L (ref 100–240)
LYMPHOCYTES # BLD: 1.07 K/MM3 (ref 0.84–5.2)
LYMPHOCYTES # BLD: 1.43 K/MM3 (ref 0.84–5.2)
LYMPHOCYTES NFR BLD: 3 % (ref 21–46)
LYMPHOCYTES NFR BLD: 4 % (ref 21–46)
MCHC RBC AUTO-ENTMCNC: 31.9 G/DL (ref 31.5–36.5)
MCHC RBC AUTO-ENTMCNC: 32.1 G/DL (ref 31.5–36.5)
MCV RBC AUTO: 103 FL (ref 80–100)
MCV RBC AUTO: 103 FL (ref 80–100)
METAMYELOCYTES # BLD MANUAL: 0.71 K/MM3 (ref 0–0)
METAMYELOCYTES # BLD MANUAL: 1.43 K/MM3 (ref 0–0)
METAMYELOCYTES NFR BLD MANUAL: 2 % (ref 0–0)
METAMYELOCYTES NFR BLD MANUAL: 4 % (ref 0–0)
MONOCYTES # BLD: 1.07 K/MM3 (ref 0.16–1.47)
MONOCYTES # BLD: 1.78 K/MM3 (ref 0.16–1.47)
MONOCYTES NFR BLD: 3 % (ref 4–13)
MONOCYTES NFR BLD: 5 % (ref 4–13)
MYELOCYTES # BLD MANUAL: 0.35 K/MM3 (ref 0–0)
MYELOCYTES NFR BLD MANUAL: 1 % (ref 0–0)
NEUTS BAND NFR BLD MANUAL: 19 % (ref 0–8)
NEUTS BAND NFR BLD MANUAL: 33 % (ref 0–8)
NEUTS SEG # BLD MANUAL: 31.11 K/MM3 (ref 1.96–9.15)
NEUTS SEG # BLD MANUAL: 32.59 K/MM3 (ref 1.96–9.15)
NEUTS SEG NFR BLD MANUAL: 54 % (ref 41–73)
NEUTS SEG NFR BLD MANUAL: 72 % (ref 41–73)
NRBC # BLD AUTO: 0.07 K/MM3 (ref 0–0.02)
NRBC # BLD AUTO: 0.09 K/MM3 (ref 0–0.02)
NRBC BLD AUTO-RTO: 0.2 /100 WBC (ref 0–0.2)
NRBC BLD AUTO-RTO: 0.3 /100 WBC (ref 0–0.2)
PLATELET # BLD AUTO: 120 K/MM3 (ref 150–400)
PLATELET # BLD AUTO: 131 K/MM3 (ref 150–400)
POTASSIUM SERPL-SCNC: 3.5 MMOL/L (ref 3.5–5.5)
PROT SERPL-MCNC: 5.1 G/DL (ref 6.4–8.2)
PROT UR STRIP-MCNC: (no result) MG/DL
RBC #/AREA URNS HPF: (no result) /HPF (ref 0–2)
RSV RNA SPEC QL NAA+PROBE: NEGATIVE
SARS-COV-2 RNA RESP QL NAA+PROBE: NEGATIVE
SODIUM SERPL-SCNC: 137 MMOL/L (ref 136–145)
SP GR SPEC: 1.01 (ref 1–1.02)
TOTAL CELLS COUNTED BLD: 100
TOTAL CELLS COUNTED BLD: 100
UROBILINOGEN UR STRIP-MCNC: (no result) MG/DL
WBC #/AREA URNS HPF: (no result) /HPF (ref 0–5)

## 2022-07-21 LAB
ALBUMIN FLD-MCNC: 1.3 G/DL
ANION GAP SERPL CALCULATED.4IONS-SCNC: 7 MMOL/L (ref 6–16)
BASOPHILS # BLD: 0 K/MM3 (ref 0–0.23)
BASOPHILS NFR BLD: 0 % (ref 0–2)
BUN SERPL-MCNC: 11 MG/DL (ref 8–24)
CALCIUM SERPL-MCNC: 8.1 MG/DL (ref 8.5–10.1)
CHLORIDE SERPL-SCNC: 103 MMOL/L (ref 98–108)
CO2 SERPL-SCNC: 27 MMOL/L (ref 21–32)
CREAT SERPL-MCNC: 0.4 MG/DL (ref 0.4–1)
DEPRECATED RDW RBC AUTO: 63.2 FL (ref 35.1–46.3)
EOSINOPHIL # BLD: 0 K/MM3 (ref 0–0.68)
EOSINOPHIL NFR BLD: 0 % (ref 0–6)
ERYTHROCYTE [DISTWIDTH] IN BLOOD BY AUTOMATED COUNT: 16.8 % (ref 11.7–14.2)
GLUCOSE FLD-MCNC: 105 MG/DL
GLUCOSE SERPL-MCNC: 119 MG/DL (ref 70–99)
HCT VFR BLD AUTO: 20.7 % (ref 33–51)
HGB BLD-MCNC: 6.7 G/DL (ref 11.5–16)
LDH SPEC-CCNC: 548 U/L
LYMPHOCYTES # BLD: 0.78 K/MM3 (ref 0.84–5.2)
LYMPHOCYTES NFR BLD: 3 % (ref 21–46)
LYMPHOCYTES NFR FLD MANUAL: 10 % (ref 0–18)
MCHC RBC AUTO-ENTMCNC: 32.4 G/DL (ref 31.5–36.5)
MCV RBC AUTO: 103 FL (ref 80–100)
METAMYELOCYTES # BLD MANUAL: 0.52 K/MM3 (ref 0–0)
METAMYELOCYTES NFR BLD MANUAL: 2 % (ref 0–0)
MONOCYTES # BLD: 0.52 K/MM3 (ref 0.16–1.47)
MONOCYTES NFR BLD: 2 % (ref 4–13)
MONONUC CELLS NFR FLD MANUAL: 7 % (ref 0–50)
MYELOCYTES # BLD MANUAL: 0.26 K/MM3 (ref 0–0)
MYELOCYTES NFR BLD MANUAL: 1 % (ref 0–0)
NEUTS BAND NFR BLD MANUAL: 13 % (ref 0–8)
NEUTS SEG # BLD MANUAL: 23.94 K/MM3 (ref 1.96–9.15)
NEUTS SEG NFR BLD MANUAL: 79 % (ref 41–73)
NEUTS SEG NFR FLD MANUAL: 83 % (ref 0–25)
NRBC # BLD AUTO: 0.05 K/MM3 (ref 0–0.02)
NRBC BLD AUTO-RTO: 0.2 /100 WBC (ref 0–0.2)
PLATELET # BLD AUTO: 144 K/MM3 (ref 150–400)
POTASSIUM SERPL-SCNC: 3.1 MMOL/L (ref 3.5–5.5)
RBC # FLD MANUAL: (no result) M/MM3 (ref 0–0)
RBC # FLD MANUAL: 406 /MM3 (ref 0–0)
SODIUM SERPL-SCNC: 137 MMOL/L (ref 136–145)
TOTAL CELLS COUNTED BLD: 100
TOTAL CELLS COUNTED SPEC: 100
VANCOMYCIN TROUGH SERPL-MCNC: 9.2 UG/ML (ref 5–10)
WBC # FLD AUTO: 1.4 K/MM3 (ref 0–999)

## 2022-07-21 PROCEDURE — 30233N1 TRANSFUSION OF NONAUTOLOGOUS RED BLOOD CELLS INTO PERIPHERAL VEIN, PERCUTANEOUS APPROACH: ICD-10-PCS | Performed by: INTERNAL MEDICINE

## 2022-07-22 LAB
ALBUMIN SERPL BCP-MCNC: 1.5 G/DL (ref 3.4–5)
ALBUMIN/GLOB SERPL: 0.4 {RATIO} (ref 0.8–1.8)
ALT SERPL W P-5'-P-CCNC: 11 U/L (ref 12–78)
ANION GAP SERPL CALCULATED.4IONS-SCNC: 6 MMOL/L (ref 6–16)
AST SERPL W P-5'-P-CCNC: 9 U/L (ref 12–37)
BASOPHILS # BLD: 0 K/MM3 (ref 0–0.23)
BASOPHILS NFR BLD: 0 % (ref 0–2)
BILIRUB SERPL-MCNC: 0.3 MG/DL (ref 0.1–1)
BUN SERPL-MCNC: 11 MG/DL (ref 8–24)
CALCIUM SERPL-MCNC: 7.8 MG/DL (ref 8.5–10.1)
CHLORIDE SERPL-SCNC: 107 MMOL/L (ref 98–108)
CO2 SERPL-SCNC: 26 MMOL/L (ref 21–32)
CREAT SERPL-MCNC: 0.36 MG/DL (ref 0.4–1)
DEPRECATED RDW RBC AUTO: 70.8 FL (ref 35.1–46.3)
EOSINOPHIL # BLD: 0 K/MM3 (ref 0–0.68)
EOSINOPHIL NFR BLD: 0 % (ref 0–6)
ERYTHROCYTE [DISTWIDTH] IN BLOOD BY AUTOMATED COUNT: 20.2 % (ref 11.7–14.2)
FERRITIN SERPL-MCNC: 1328 NG/ML (ref 8–252)
GLOBULIN SER CALC-MCNC: 3.4 G/DL (ref 2.2–4)
GLUCOSE SERPL-MCNC: 166 MG/DL (ref 70–99)
HCT VFR BLD AUTO: 25.8 % (ref 33–51)
HGB BLD-MCNC: 8.5 G/DL (ref 11.5–16)
LYMPHOCYTES # BLD: 0.86 K/MM3 (ref 0.84–5.2)
LYMPHOCYTES NFR BLD: 3 % (ref 21–46)
MCHC RBC AUTO-ENTMCNC: 32.9 G/DL (ref 31.5–36.5)
MCV RBC AUTO: 96 FL (ref 80–100)
MONOCYTES # BLD: 0.86 K/MM3 (ref 0.16–1.47)
MONOCYTES NFR BLD: 3 % (ref 4–13)
NEUTS BAND NFR BLD MANUAL: 17 % (ref 0–8)
NEUTS SEG # BLD MANUAL: 27.15 K/MM3 (ref 1.96–9.15)
NEUTS SEG NFR BLD MANUAL: 77 % (ref 41–73)
NRBC # BLD AUTO: 0.04 K/MM3 (ref 0–0.02)
NRBC BLD AUTO-RTO: 0.1 /100 WBC (ref 0–0.2)
PLATELET # BLD AUTO: 126 K/MM3 (ref 150–400)
POTASSIUM SERPL-SCNC: 4.4 MMOL/L (ref 3.5–5.5)
PROT SERPL-MCNC: 4.9 G/DL (ref 6.4–8.2)
SODIUM SERPL-SCNC: 139 MMOL/L (ref 136–145)
TIBC SERPL-MCNC: 96 UG/DL (ref 250–450)
TOTAL CELLS COUNTED BLD: 100

## 2022-07-23 LAB
ANION GAP SERPL CALCULATED.4IONS-SCNC: 4 MMOL/L (ref 6–16)
BASOPHILS # BLD: 0 K/MM3 (ref 0–0.23)
BASOPHILS NFR BLD: 0 % (ref 0–2)
BUN SERPL-MCNC: 9 MG/DL (ref 8–24)
CALCIUM SERPL-MCNC: 8.4 MG/DL (ref 8.5–10.1)
CHLORIDE SERPL-SCNC: 106 MMOL/L (ref 98–108)
CO2 SERPL-SCNC: 29 MMOL/L (ref 21–32)
CREAT SERPL-MCNC: 0.35 MG/DL (ref 0.4–1)
DEPRECATED RDW RBC AUTO: 71.7 FL (ref 35.1–46.3)
EOSINOPHIL # BLD: 0 K/MM3 (ref 0–0.68)
EOSINOPHIL NFR BLD: 0 % (ref 0–6)
ERYTHROCYTE [DISTWIDTH] IN BLOOD BY AUTOMATED COUNT: 19.5 % (ref 11.7–14.2)
GLUCOSE SERPL-MCNC: 99 MG/DL (ref 70–99)
HCT VFR BLD AUTO: 28 % (ref 33–51)
HGB BLD-MCNC: 8.9 G/DL (ref 11.5–16)
LYMPHOCYTES # BLD: 0.66 K/MM3 (ref 0.84–5.2)
LYMPHOCYTES NFR BLD: 3 % (ref 21–46)
MCHC RBC AUTO-ENTMCNC: 31.8 G/DL (ref 31.5–36.5)
MCV RBC AUTO: 98 FL (ref 80–100)
MONOCYTES # BLD: 1.1 K/MM3 (ref 0.16–1.47)
MONOCYTES NFR BLD: 5 % (ref 4–13)
MYELOCYTES # BLD MANUAL: 0.22 K/MM3 (ref 0–0)
MYELOCYTES NFR BLD MANUAL: 1 % (ref 0–0)
NEUTS BAND NFR BLD MANUAL: 18 % (ref 0–8)
NEUTS SEG # BLD MANUAL: 20.12 K/MM3 (ref 1.96–9.15)
NEUTS SEG NFR BLD MANUAL: 73 % (ref 41–73)
NRBC # BLD AUTO: 0.04 K/MM3 (ref 0–0.02)
NRBC BLD AUTO-RTO: 0.2 /100 WBC (ref 0–0.2)
PLATELET # BLD AUTO: 138 K/MM3 (ref 150–400)
POTASSIUM SERPL-SCNC: 4.1 MMOL/L (ref 3.5–5.5)
SODIUM SERPL-SCNC: 139 MMOL/L (ref 136–145)
TOTAL CELLS COUNTED BLD: 100
VANCOMYCIN TROUGH SERPL-MCNC: 11.9 UG/ML (ref 5–10)

## 2022-07-25 LAB — VANCOMYCIN TROUGH SERPL-MCNC: 11.2 UG/ML (ref 5–10)

## 2022-07-26 ENCOUNTER — HOSPITAL ENCOUNTER (OUTPATIENT)
Dept: HOSPITAL 95 - ATC | Age: 72
Discharge: HOME | End: 2022-07-26
Attending: HOSPITALIST
Payer: COMMERCIAL

## 2022-07-26 DIAGNOSIS — J43.9: ICD-10-CM

## 2022-07-26 DIAGNOSIS — Z88.6: ICD-10-CM

## 2022-07-26 DIAGNOSIS — B95.62: ICD-10-CM

## 2022-07-26 DIAGNOSIS — T85.79XA: Primary | ICD-10-CM

## 2022-07-26 DIAGNOSIS — Z66: ICD-10-CM

## 2022-07-26 DIAGNOSIS — Z88.5: ICD-10-CM

## 2022-07-26 DIAGNOSIS — Z79.899: ICD-10-CM

## 2022-07-27 ENCOUNTER — HOSPITAL ENCOUNTER (OUTPATIENT)
Dept: HOSPITAL 95 - ATC | Age: 72
Discharge: HOME | End: 2022-07-27
Attending: HOSPITALIST
Payer: COMMERCIAL

## 2022-07-27 DIAGNOSIS — B95.62: ICD-10-CM

## 2022-07-27 DIAGNOSIS — Z88.5: ICD-10-CM

## 2022-07-27 DIAGNOSIS — M81.0: ICD-10-CM

## 2022-07-27 DIAGNOSIS — C50.919: ICD-10-CM

## 2022-07-27 DIAGNOSIS — T85.79XA: ICD-10-CM

## 2022-07-27 DIAGNOSIS — J86.9: Primary | ICD-10-CM

## 2022-07-27 DIAGNOSIS — Z88.6: ICD-10-CM

## 2022-07-27 DIAGNOSIS — Z66: ICD-10-CM

## 2022-07-28 ENCOUNTER — HOSPITAL ENCOUNTER (OUTPATIENT)
Dept: HOSPITAL 95 - ATC | Age: 72
Discharge: HOME | End: 2022-07-28
Attending: HOSPITALIST
Payer: COMMERCIAL

## 2022-07-28 DIAGNOSIS — T85.79XA: Primary | ICD-10-CM

## 2022-07-28 DIAGNOSIS — B95.62: ICD-10-CM

## 2022-07-28 DIAGNOSIS — J86.9: ICD-10-CM

## 2022-07-28 LAB
CREAT SERPL-MCNC: 0.42 MG/DL (ref 0.4–1)
VANCOMYCIN TROUGH SERPL-MCNC: 15.6 UG/ML (ref 5–10)

## 2022-07-29 ENCOUNTER — HOSPITAL ENCOUNTER (OUTPATIENT)
Dept: HOSPITAL 95 - ATC | Age: 72
Discharge: HOME | End: 2022-07-29
Attending: HOSPITALIST
Payer: COMMERCIAL

## 2022-07-29 DIAGNOSIS — J86.9: ICD-10-CM

## 2022-07-29 DIAGNOSIS — T85.79XA: Primary | ICD-10-CM

## 2022-07-29 DIAGNOSIS — B95.62: ICD-10-CM

## 2022-07-30 ENCOUNTER — HOSPITAL ENCOUNTER (OUTPATIENT)
Dept: HOSPITAL 95 - ATC | Age: 72
Discharge: HOME | End: 2022-07-30
Attending: HOSPITALIST
Payer: COMMERCIAL

## 2022-07-30 DIAGNOSIS — B95.62: ICD-10-CM

## 2022-07-30 DIAGNOSIS — J18.9: ICD-10-CM

## 2022-07-30 DIAGNOSIS — E87.1: ICD-10-CM

## 2022-07-30 DIAGNOSIS — Z88.5: ICD-10-CM

## 2022-07-30 DIAGNOSIS — I31.3: ICD-10-CM

## 2022-07-30 DIAGNOSIS — M81.0: ICD-10-CM

## 2022-07-30 DIAGNOSIS — Z88.6: ICD-10-CM

## 2022-07-30 DIAGNOSIS — J86.9: Primary | ICD-10-CM

## 2022-07-30 DIAGNOSIS — R60.1: ICD-10-CM

## 2022-07-31 ENCOUNTER — HOSPITAL ENCOUNTER (OUTPATIENT)
Dept: HOSPITAL 95 - ATC | Age: 72
Discharge: HOME | End: 2022-07-31
Attending: HOSPITALIST
Payer: COMMERCIAL

## 2022-07-31 DIAGNOSIS — J86.9: Primary | ICD-10-CM

## 2022-07-31 DIAGNOSIS — M81.0: ICD-10-CM

## 2022-07-31 DIAGNOSIS — Z88.6: ICD-10-CM

## 2022-07-31 DIAGNOSIS — R60.1: ICD-10-CM

## 2022-07-31 DIAGNOSIS — Z88.5: ICD-10-CM

## 2022-07-31 DIAGNOSIS — B95.62: ICD-10-CM

## 2022-07-31 DIAGNOSIS — E87.1: ICD-10-CM

## 2022-07-31 LAB
CREAT SERPL-MCNC: 0.5 MG/DL (ref 0.4–1)
VANCOMYCIN TROUGH SERPL-MCNC: 23.6 UG/ML (ref 5–10)

## 2022-08-01 ENCOUNTER — HOSPITAL ENCOUNTER (OUTPATIENT)
Dept: HOSPITAL 95 - ATC | Age: 72
Discharge: HOME | End: 2022-08-01
Attending: HOSPITALIST
Payer: COMMERCIAL

## 2022-08-01 VITALS — BODY MASS INDEX: 18.18 KG/M2 | WEIGHT: 92.59 LBS | HEIGHT: 60 IN

## 2022-08-01 DIAGNOSIS — Z88.5: ICD-10-CM

## 2022-08-01 DIAGNOSIS — J86.9: Primary | ICD-10-CM

## 2022-08-01 DIAGNOSIS — T85.79XA: ICD-10-CM

## 2022-08-01 DIAGNOSIS — B95.62: ICD-10-CM

## 2022-08-01 DIAGNOSIS — Z88.8: ICD-10-CM

## 2022-08-01 DIAGNOSIS — Z66: ICD-10-CM

## 2022-08-01 LAB
CREAT SERPL-MCNC: 0.47 MG/DL (ref 0.4–1)
VANCOMYCIN TROUGH SERPL-MCNC: 11.3 UG/ML (ref 5–10)

## 2022-08-02 ENCOUNTER — HOSPITAL ENCOUNTER (OUTPATIENT)
Dept: HOSPITAL 95 - ATC | Age: 72
Discharge: HOME | End: 2022-08-02
Attending: HOSPITALIST
Payer: COMMERCIAL

## 2022-08-02 DIAGNOSIS — B95.62: ICD-10-CM

## 2022-08-02 DIAGNOSIS — J86.9: Primary | ICD-10-CM

## 2022-08-02 DIAGNOSIS — C50.919: ICD-10-CM

## 2022-08-03 ENCOUNTER — HOSPITAL ENCOUNTER (OUTPATIENT)
Dept: HOSPITAL 95 - ATC | Age: 72
Discharge: HOME | End: 2022-08-03
Attending: HOSPITALIST
Payer: COMMERCIAL

## 2022-08-03 DIAGNOSIS — T85.79XA: Primary | ICD-10-CM

## 2022-08-03 DIAGNOSIS — B95.62: ICD-10-CM

## 2022-08-03 DIAGNOSIS — J86.9: ICD-10-CM

## 2022-08-03 LAB
CREAT SERPL-MCNC: 0.5 MG/DL (ref 0.4–1)
VANCOMYCIN TROUGH SERPL-MCNC: 15.3 UG/ML (ref 5–10)

## 2022-08-05 ENCOUNTER — HOSPITAL ENCOUNTER (OUTPATIENT)
Dept: HOSPITAL 95 - ATC | Age: 72
Discharge: HOME | End: 2022-08-05
Attending: HOSPITALIST
Payer: COMMERCIAL

## 2022-08-05 DIAGNOSIS — Z88.6: ICD-10-CM

## 2022-08-05 DIAGNOSIS — Z85.3: ICD-10-CM

## 2022-08-05 DIAGNOSIS — Z92.21: ICD-10-CM

## 2022-08-05 DIAGNOSIS — J86.9: Primary | ICD-10-CM

## 2022-08-05 DIAGNOSIS — Z88.8: ICD-10-CM

## 2022-08-05 DIAGNOSIS — B95.62: ICD-10-CM

## 2022-08-05 DIAGNOSIS — Z88.5: ICD-10-CM

## 2022-08-06 ENCOUNTER — HOSPITAL ENCOUNTER (OUTPATIENT)
Dept: HOSPITAL 95 - ATC | Age: 72
Discharge: HOME | End: 2022-08-06
Attending: HOSPITALIST
Payer: COMMERCIAL

## 2022-08-06 DIAGNOSIS — B95.62: ICD-10-CM

## 2022-08-06 DIAGNOSIS — T85.79XA: Primary | ICD-10-CM

## 2022-08-06 DIAGNOSIS — J86.9: ICD-10-CM

## 2022-08-06 LAB
CREAT SERPL-MCNC: 0.54 MG/DL (ref 0.4–1)
VANCOMYCIN TROUGH SERPL-MCNC: 16.7 UG/ML (ref 5–10)

## 2022-08-06 NOTE — NUR
0917: ABLE TO ASPIRATE AND WASTE 5 ML BLOOD, SAMPLE TAKENN AND SENT TO LAB
([facility].)  PT WATCHING TV.  WILL AWAIT LAB RESULTS TO DETERMINE THE
DOSE OF VANCO PT WILL RECEIVE TODAY.

## 2022-08-07 ENCOUNTER — HOSPITAL ENCOUNTER (OUTPATIENT)
Dept: HOSPITAL 95 - ATC | Age: 72
Discharge: HOME | End: 2022-08-07
Attending: HOSPITALIST
Payer: COMMERCIAL

## 2022-08-07 DIAGNOSIS — B95.62: ICD-10-CM

## 2022-08-07 DIAGNOSIS — T85.79XA: Primary | ICD-10-CM

## 2022-08-07 DIAGNOSIS — J86.9: ICD-10-CM

## 2022-08-08 ENCOUNTER — HOSPITAL ENCOUNTER (OUTPATIENT)
Dept: HOSPITAL 95 - ATC | Age: 72
Discharge: HOME | End: 2022-08-08
Attending: HOSPITALIST
Payer: COMMERCIAL

## 2022-08-08 DIAGNOSIS — J86.9: ICD-10-CM

## 2022-08-08 DIAGNOSIS — B95.62: ICD-10-CM

## 2022-08-08 DIAGNOSIS — T85.79XA: Primary | ICD-10-CM

## 2022-08-09 ENCOUNTER — HOSPITAL ENCOUNTER (OUTPATIENT)
Dept: HOSPITAL 95 - ATC | Age: 72
Discharge: HOME | End: 2022-08-09
Attending: HOSPITALIST
Payer: COMMERCIAL

## 2022-08-09 DIAGNOSIS — B95.62: ICD-10-CM

## 2022-08-09 DIAGNOSIS — Z88.8: ICD-10-CM

## 2022-08-09 DIAGNOSIS — Z88.6: ICD-10-CM

## 2022-08-09 DIAGNOSIS — Z88.5: ICD-10-CM

## 2022-08-09 DIAGNOSIS — Z85.9: ICD-10-CM

## 2022-08-09 DIAGNOSIS — J86.9: Primary | ICD-10-CM

## 2022-08-09 LAB
CREAT SERPL-MCNC: 0.49 MG/DL (ref 0.4–1)
VANCOMYCIN TROUGH SERPL-MCNC: 15.9 UG/ML (ref 5–10)

## 2022-08-10 ENCOUNTER — HOSPITAL ENCOUNTER (OUTPATIENT)
Dept: HOSPITAL 95 - ATC | Age: 72
Discharge: HOME | End: 2022-08-10
Attending: HOSPITALIST
Payer: COMMERCIAL

## 2022-08-10 DIAGNOSIS — J86.9: ICD-10-CM

## 2022-08-10 DIAGNOSIS — T85.79XA: Primary | ICD-10-CM

## 2022-08-10 DIAGNOSIS — B95.62: ICD-10-CM

## 2022-08-11 ENCOUNTER — HOSPITAL ENCOUNTER (OUTPATIENT)
Dept: HOSPITAL 95 - ATC | Age: 72
Discharge: HOME | End: 2022-08-11
Attending: HOSPITALIST
Payer: COMMERCIAL

## 2022-08-11 DIAGNOSIS — Z88.5: ICD-10-CM

## 2022-08-11 DIAGNOSIS — Z96.89: ICD-10-CM

## 2022-08-11 DIAGNOSIS — B95.62: ICD-10-CM

## 2022-08-11 DIAGNOSIS — Z88.8: ICD-10-CM

## 2022-08-11 DIAGNOSIS — C79.70: ICD-10-CM

## 2022-08-11 DIAGNOSIS — C50.912: ICD-10-CM

## 2022-08-11 DIAGNOSIS — Z99.81: ICD-10-CM

## 2022-08-11 DIAGNOSIS — J86.9: Primary | ICD-10-CM

## 2022-08-14 ENCOUNTER — HOSPITAL ENCOUNTER (OUTPATIENT)
Dept: HOSPITAL 95 - ATC | Age: 72
Discharge: HOME | End: 2022-08-14
Attending: HOSPITALIST
Payer: COMMERCIAL

## 2022-08-14 DIAGNOSIS — Z88.5: ICD-10-CM

## 2022-08-14 DIAGNOSIS — Z88.6: ICD-10-CM

## 2022-08-14 DIAGNOSIS — J91.0: ICD-10-CM

## 2022-08-14 DIAGNOSIS — T85.79XA: Primary | ICD-10-CM

## 2022-08-14 LAB
BASOPHILS # BLD AUTO: 0.03 K/MM3 (ref 0–0.23)
BASOPHILS NFR BLD AUTO: 1 % (ref 0–2)
DEPRECATED RDW RBC AUTO: 56.8 FL (ref 35.1–46.3)
EOSINOPHIL # BLD AUTO: 0.19 K/MM3 (ref 0–0.68)
EOSINOPHIL NFR BLD AUTO: 4 % (ref 0–6)
ERYTHROCYTE [DISTWIDTH] IN BLOOD BY AUTOMATED COUNT: 16.1 % (ref 11.7–14.2)
HCT VFR BLD AUTO: 26.5 % (ref 33–51)
HGB BLD-MCNC: 8.5 G/DL (ref 11.5–16)
IMM GRANULOCYTES # BLD AUTO: 0.02 K/MM3 (ref 0–0.1)
IMM GRANULOCYTES NFR BLD AUTO: 1 % (ref 0–1)
LYMPHOCYTES # BLD AUTO: 0.73 K/MM3 (ref 0.84–5.2)
LYMPHOCYTES NFR BLD AUTO: 17 % (ref 21–46)
MCHC RBC AUTO-ENTMCNC: 32.1 G/DL (ref 31.5–36.5)
MCV RBC AUTO: 95 FL (ref 80–100)
MONOCYTES # BLD AUTO: 0.5 K/MM3 (ref 0.16–1.47)
MONOCYTES NFR BLD AUTO: 11 % (ref 4–13)
NEUTROPHILS # BLD AUTO: 2.96 K/MM3 (ref 1.96–9.15)
NEUTROPHILS NFR BLD AUTO: 67 % (ref 41–73)
NRBC # BLD AUTO: 0 K/MM3 (ref 0–0.02)
NRBC BLD AUTO-RTO: 0 /100 WBC (ref 0–0.2)
PLATELET # BLD AUTO: 296 K/MM3 (ref 150–400)

## 2022-08-15 ENCOUNTER — HOSPITAL ENCOUNTER (OUTPATIENT)
Dept: HOSPITAL 95 - ATC | Age: 72
Discharge: HOME | End: 2022-08-15
Attending: HOSPITALIST
Payer: COMMERCIAL

## 2022-08-15 DIAGNOSIS — T85.79XA: ICD-10-CM

## 2022-08-15 DIAGNOSIS — J86.9: Primary | ICD-10-CM

## 2022-08-16 ENCOUNTER — HOSPITAL ENCOUNTER (OUTPATIENT)
Dept: HOSPITAL 95 - LAB SHORT | Age: 72
Discharge: HOME | End: 2022-08-16
Attending: INTERNAL MEDICINE
Payer: COMMERCIAL

## 2022-08-16 DIAGNOSIS — J91.0: Primary | ICD-10-CM

## 2022-08-23 ENCOUNTER — HOSPITAL ENCOUNTER (OUTPATIENT)
Dept: HOSPITAL 95 - ATC | Age: 72
Discharge: HOME | End: 2022-08-23
Attending: INTERNAL MEDICINE
Payer: COMMERCIAL

## 2022-08-23 DIAGNOSIS — C79.70: ICD-10-CM

## 2022-08-23 DIAGNOSIS — Z88.6: ICD-10-CM

## 2022-08-23 DIAGNOSIS — C50.912: Primary | ICD-10-CM

## 2022-08-23 DIAGNOSIS — Z88.5: ICD-10-CM

## 2022-08-23 LAB
ALBUMIN SERPL BCP-MCNC: 2.4 G/DL (ref 3.4–5)
ALBUMIN/GLOB SERPL: 0.6 {RATIO} (ref 0.8–1.8)
ALT SERPL W P-5'-P-CCNC: 22 U/L (ref 12–78)
ANION GAP SERPL CALCULATED.4IONS-SCNC: 3 MMOL/L (ref 6–16)
AST SERPL W P-5'-P-CCNC: 15 U/L (ref 12–37)
BASOPHILS # BLD AUTO: 0.03 K/MM3 (ref 0–0.23)
BASOPHILS NFR BLD AUTO: 1 % (ref 0–2)
BILIRUB SERPL-MCNC: 0.2 MG/DL (ref 0.1–1)
BUN SERPL-MCNC: 16 MG/DL (ref 8–24)
CALCIUM SERPL-MCNC: 9 MG/DL (ref 8.5–10.1)
CHLORIDE SERPL-SCNC: 108 MMOL/L (ref 98–108)
CO2 SERPL-SCNC: 29 MMOL/L (ref 21–32)
CREAT SERPL-MCNC: 0.55 MG/DL (ref 0.4–1)
DEPRECATED RDW RBC AUTO: 52.8 FL (ref 35.1–46.3)
EOSINOPHIL # BLD AUTO: 0.19 K/MM3 (ref 0–0.68)
EOSINOPHIL NFR BLD AUTO: 5 % (ref 0–6)
ERYTHROCYTE [DISTWIDTH] IN BLOOD BY AUTOMATED COUNT: 15.1 % (ref 11.7–14.2)
GLOBULIN SER CALC-MCNC: 3.7 G/DL (ref 2.2–4)
GLUCOSE SERPL-MCNC: 119 MG/DL (ref 70–99)
HCT VFR BLD AUTO: 26.5 % (ref 33–51)
HGB BLD-MCNC: 8.5 G/DL (ref 11.5–16)
IMM GRANULOCYTES # BLD AUTO: 0.01 K/MM3 (ref 0–0.1)
IMM GRANULOCYTES NFR BLD AUTO: 0 % (ref 0–1)
LYMPHOCYTES # BLD AUTO: 0.92 K/MM3 (ref 0.84–5.2)
LYMPHOCYTES NFR BLD AUTO: 22 % (ref 21–46)
MCHC RBC AUTO-ENTMCNC: 32.1 G/DL (ref 31.5–36.5)
MCV RBC AUTO: 94 FL (ref 80–100)
MONOCYTES # BLD AUTO: 0.38 K/MM3 (ref 0.16–1.47)
MONOCYTES NFR BLD AUTO: 9 % (ref 4–13)
NEUTROPHILS # BLD AUTO: 2.7 K/MM3 (ref 1.96–9.15)
NEUTROPHILS NFR BLD AUTO: 64 % (ref 41–73)
NRBC # BLD AUTO: 0 K/MM3 (ref 0–0.02)
NRBC BLD AUTO-RTO: 0 /100 WBC (ref 0–0.2)
PLATELET # BLD AUTO: 205 K/MM3 (ref 150–400)
POTASSIUM SERPL-SCNC: 3.4 MMOL/L (ref 3.5–5.5)
PROT SERPL-MCNC: 6.1 G/DL (ref 6.4–8.2)
SODIUM SERPL-SCNC: 140 MMOL/L (ref 136–145)

## 2022-09-02 ENCOUNTER — HOSPITAL ENCOUNTER (OUTPATIENT)
Dept: HOSPITAL 95 - ATC | Age: 72
Discharge: HOME | End: 2022-09-02
Attending: INTERNAL MEDICINE
Payer: COMMERCIAL

## 2022-09-02 DIAGNOSIS — Z88.5: ICD-10-CM

## 2022-09-02 DIAGNOSIS — C50.912: Primary | ICD-10-CM

## 2022-09-02 DIAGNOSIS — M54.9: ICD-10-CM

## 2022-09-02 DIAGNOSIS — Z88.6: ICD-10-CM

## 2022-09-02 DIAGNOSIS — G89.29: ICD-10-CM

## 2022-09-02 DIAGNOSIS — Z79.899: ICD-10-CM

## 2022-09-02 DIAGNOSIS — G47.30: ICD-10-CM

## 2022-10-17 ENCOUNTER — HOSPITAL ENCOUNTER (INPATIENT)
Dept: HOSPITAL 95 - ER | Age: 72
LOS: 3 days | Discharge: HOSPICE | DRG: 640 | End: 2022-10-20
Attending: HOSPITALIST | Admitting: HOSPITALIST
Payer: COMMERCIAL

## 2022-10-17 VITALS — BODY MASS INDEX: 17.92 KG/M2 | WEIGHT: 91.27 LBS | HEIGHT: 60 IN

## 2022-10-17 DIAGNOSIS — Z92.3: ICD-10-CM

## 2022-10-17 DIAGNOSIS — C79.72: ICD-10-CM

## 2022-10-17 DIAGNOSIS — N39.0: ICD-10-CM

## 2022-10-17 DIAGNOSIS — R79.89: ICD-10-CM

## 2022-10-17 DIAGNOSIS — I31.39: ICD-10-CM

## 2022-10-17 DIAGNOSIS — D72.819: ICD-10-CM

## 2022-10-17 DIAGNOSIS — Z98.890: ICD-10-CM

## 2022-10-17 DIAGNOSIS — C78.7: ICD-10-CM

## 2022-10-17 DIAGNOSIS — A41.89: ICD-10-CM

## 2022-10-17 DIAGNOSIS — E87.0: ICD-10-CM

## 2022-10-17 DIAGNOSIS — Z88.5: ICD-10-CM

## 2022-10-17 DIAGNOSIS — M81.0: ICD-10-CM

## 2022-10-17 DIAGNOSIS — C79.71: ICD-10-CM

## 2022-10-17 DIAGNOSIS — B96.1: ICD-10-CM

## 2022-10-17 DIAGNOSIS — E87.6: ICD-10-CM

## 2022-10-17 DIAGNOSIS — E83.52: Primary | ICD-10-CM

## 2022-10-17 DIAGNOSIS — Z79.899: ICD-10-CM

## 2022-10-17 DIAGNOSIS — E78.00: ICD-10-CM

## 2022-10-17 DIAGNOSIS — Z92.21: ICD-10-CM

## 2022-10-17 DIAGNOSIS — Z88.6: ICD-10-CM

## 2022-10-17 DIAGNOSIS — R62.7: ICD-10-CM

## 2022-10-17 DIAGNOSIS — E43: ICD-10-CM

## 2022-10-17 DIAGNOSIS — D69.6: ICD-10-CM

## 2022-10-17 DIAGNOSIS — Z85.3: ICD-10-CM

## 2022-10-17 DIAGNOSIS — E86.0: ICD-10-CM

## 2022-10-17 DIAGNOSIS — E83.39: ICD-10-CM

## 2022-10-17 DIAGNOSIS — G89.4: ICD-10-CM

## 2022-10-17 DIAGNOSIS — E83.42: ICD-10-CM

## 2022-10-17 DIAGNOSIS — D63.1: ICD-10-CM

## 2022-10-17 DIAGNOSIS — Z90.12: ICD-10-CM

## 2022-10-17 LAB
ALBUMIN SERPL BCP-MCNC: 2.8 G/DL (ref 3.4–5)
ALBUMIN/GLOB SERPL: 0.7 {RATIO} (ref 0.8–1.8)
ALT SERPL W P-5'-P-CCNC: 90 U/L (ref 12–78)
ANION GAP SERPL CALCULATED.4IONS-SCNC: 11 MMOL/L (ref 6–16)
AST SERPL W P-5'-P-CCNC: 160 U/L (ref 12–37)
BASOPHILS # BLD AUTO: 0.01 K/MM3 (ref 0–0.23)
BASOPHILS NFR BLD AUTO: 0 % (ref 0–2)
BILIRUB SERPL-MCNC: 0.5 MG/DL (ref 0.1–1)
BUN SERPL-MCNC: 34 MG/DL (ref 8–24)
CALCIUM SERPL-MCNC: 15.7 MG/DL (ref 8.5–10.1)
CHLORIDE SERPL-SCNC: 104 MMOL/L (ref 98–108)
CO2 SERPL-SCNC: 26 MMOL/L (ref 21–32)
CREAT SERPL-MCNC: 0.94 MG/DL (ref 0.4–1)
DEPRECATED RDW RBC AUTO: 44.7 FL (ref 35.1–46.3)
EOSINOPHIL # BLD AUTO: 0 K/MM3 (ref 0–0.68)
EOSINOPHIL NFR BLD AUTO: 0 % (ref 0–6)
ERYTHROCYTE [DISTWIDTH] IN BLOOD BY AUTOMATED COUNT: 13.8 % (ref 11.7–14.2)
GLOBULIN SER CALC-MCNC: 3.9 G/DL (ref 2.2–4)
GLUCOSE SERPL-MCNC: 127 MG/DL (ref 70–99)
HCT VFR BLD AUTO: 47.9 % (ref 33–51)
HGB BLD-MCNC: 15.2 G/DL (ref 11.5–16)
IMM GRANULOCYTES # BLD AUTO: 0.03 K/MM3 (ref 0–0.1)
IMM GRANULOCYTES NFR BLD AUTO: 1 % (ref 0–1)
LYMPHOCYTES # BLD AUTO: 0.68 K/MM3 (ref 0.84–5.2)
LYMPHOCYTES NFR BLD AUTO: 30 % (ref 21–46)
MAGNESIUM SERPL-MCNC: 1.8 MG/DL (ref 1.6–2.4)
MCHC RBC AUTO-ENTMCNC: 31.7 G/DL (ref 31.5–36.5)
MCV RBC AUTO: 89 FL (ref 80–100)
MONOCYTES # BLD AUTO: 0.16 K/MM3 (ref 0.16–1.47)
MONOCYTES NFR BLD AUTO: 7 % (ref 4–13)
NEUTROPHILS # BLD AUTO: 1.37 K/MM3 (ref 1.96–9.15)
NEUTROPHILS NFR BLD AUTO: 61 % (ref 41–73)
NRBC # BLD AUTO: 0.02 K/MM3 (ref 0–0.02)
NRBC BLD AUTO-RTO: 0.9 /100 WBC (ref 0–0.2)
PHOSPHATE SERPL-MCNC: 4.1 MG/DL (ref 2.5–4.9)
PLATELET # BLD AUTO: 37 K/MM3 (ref 150–400)
POTASSIUM SERPL-SCNC: 4.5 MMOL/L (ref 3.5–5.5)
PROT SERPL-MCNC: 6.7 G/DL (ref 6.4–8.2)
SODIUM SERPL-SCNC: 141 MMOL/L (ref 136–145)

## 2022-10-17 PROCEDURE — A9270 NON-COVERED ITEM OR SERVICE: HCPCS

## 2022-10-18 LAB
ALBUMIN SERPL BCP-MCNC: 2.2 G/DL (ref 3.4–5)
ALBUMIN/GLOB SERPL: 0.6 {RATIO} (ref 0.8–1.8)
ALT SERPL W P-5'-P-CCNC: 78 U/L (ref 12–78)
ANION GAP SERPL CALCULATED.4IONS-SCNC: 11 MMOL/L (ref 6–16)
AST SERPL W P-5'-P-CCNC: 154 U/L (ref 12–37)
BILIRUB SERPL-MCNC: 0.5 MG/DL (ref 0.1–1)
BUN SERPL-MCNC: 24 MG/DL (ref 8–24)
CALCIUM SERPL-MCNC: 12.9 MG/DL (ref 8.5–10.1)
CHLORIDE SERPL-SCNC: 111 MMOL/L (ref 98–108)
CO2 SERPL-SCNC: 24 MMOL/L (ref 21–32)
CREAT SERPL-MCNC: 0.64 MG/DL (ref 0.4–1)
DEPRECATED RDW RBC AUTO: 47 FL (ref 35.1–46.3)
ERYTHROCYTE [DISTWIDTH] IN BLOOD BY AUTOMATED COUNT: 14.1 % (ref 11.7–14.2)
FERRITIN SERPL-MCNC: 1974 NG/ML (ref 8–252)
GLOBULIN SER CALC-MCNC: 3.6 G/DL (ref 2.2–4)
GLUCOSE SERPL-MCNC: 84 MG/DL (ref 70–99)
HCT VFR BLD AUTO: 25.9 % (ref 33–51)
HGB BLD-MCNC: 8.4 G/DL (ref 11.5–16)
KETONES UR STRIP-MCNC: (no result) MG/DL
LEUKOCYTE ESTERASE UR QL STRIP: (no result)
MCHC RBC AUTO-ENTMCNC: 32.4 G/DL (ref 31.5–36.5)
MCV RBC AUTO: 91 FL (ref 80–100)
NRBC # BLD AUTO: 0.02 K/MM3 (ref 0–0.02)
NRBC BLD AUTO-RTO: 0.7 /100 WBC (ref 0–0.2)
PLATELET # BLD AUTO: 42 K/MM3 (ref 150–400)
POTASSIUM SERPL-SCNC: 4.2 MMOL/L (ref 3.5–5.5)
PROT SERPL-MCNC: 5.8 G/DL (ref 6.4–8.2)
RBC #/AREA URNS HPF: (no result) /HPF (ref 0–2)
SODIUM SERPL-SCNC: 146 MMOL/L (ref 136–145)
SP GR SPEC: 1.01 (ref 1–1.02)
TIBC SERPL-MCNC: 164 UG/DL (ref 250–450)
TSH SERPL DL<=0.005 MIU/L-ACNC: 1.82 UIU/ML (ref 0.36–4.8)
URN SPEC COLLECT METH UR: (no result)
UROBILINOGEN UR STRIP-MCNC: (no result) MG/DL
WBC #/AREA URNS HPF: (no result) /HPF (ref 0–5)

## 2022-10-18 NOTE — NUR
Summary: Patient very weak getting out of bed 1 person stand and pivot to the
commode. Patient having urine frequency needing to get up every 30 minutes.
Placed patient on pure wic so she would be able to get more rest tonight.
Frequently checked patient breif. Repositioned patient in bed with pillows
throughout night. Calcium still elevated but trending down. Patient confused
oriented x2 this evening. Consult order in for palliative team. Left message
on [facility] for them. Patient woke up and had a pudding cup this morning. Mild
pain in patient abdomen RUQ.

## 2022-10-18 NOTE — NUR
Asked to attemt to drain a smple from pleurex. accessed right side drain.
only a few MML in line would not drain to suction. Gave it some time but to
painful for patient and starting to cought and get short of breath. Did not
try the other side due to symptoms. Fluide pail yellow did not look puerulent.
Fibrin noted. clean dressings placed. previous dresseing had heavy gauze
reduces some of the jai will reassess daily.

## 2022-10-18 NOTE — NUR
SHIFT SUMMARY
 
NO ACUTE CHANGES TODAY, HER MENTATION HAS IMPROVED SLIGHTLY.  SHE HAS BEEN
ABLE TO ANSWER MORE QUESTIONS AND VERBALIZE HER REQUESTS.   HAS BEEN AT
THE BS A COUPLE TIMES TODAY.  SHE ALSO APPEARS TO BE EATING MORE TODAY THAN
YESTERDAY.  SHE WENT FOR A CT SCAN TODAY.  SHE ALSO HAS A PUR WICK APPLIED.
HER PLEURX DRAINS WERE DRAINED TODAY WITH LITTLE OUTPUT.  BED PLACED IN THE
LOWEST POSITION AND CALL LIGHT WITHIN REACH.

## 2022-10-19 LAB
ALBUMIN SERPL BCP-MCNC: 2.2 G/DL (ref 3.4–5)
ANION GAP SERPL CALCULATED.4IONS-SCNC: 13 MMOL/L (ref 6–16)
BUN SERPL-MCNC: 17 MG/DL (ref 8–24)
CALCIUM SERPL-MCNC: 11 MG/DL (ref 8.5–10.1)
CHLORIDE SERPL-SCNC: 114 MMOL/L (ref 98–108)
CO2 SERPL-SCNC: 22 MMOL/L (ref 21–32)
CREAT SERPL-MCNC: 0.62 MG/DL (ref 0.4–1)
DEPRECATED RDW RBC AUTO: 48.6 FL (ref 35.1–46.3)
ERYTHROCYTE [DISTWIDTH] IN BLOOD BY AUTOMATED COUNT: 14.5 % (ref 11.7–14.2)
GLUCOSE SERPL-MCNC: 79 MG/DL (ref 70–99)
HCT VFR BLD AUTO: 28.4 % (ref 33–51)
HGB BLD-MCNC: 8.7 G/DL (ref 11.5–16)
MAGNESIUM SERPL-MCNC: 1.2 MG/DL (ref 1.6–2.4)
MCHC RBC AUTO-ENTMCNC: 30.6 G/DL (ref 31.5–36.5)
MCV RBC AUTO: 93 FL (ref 80–100)
NRBC # BLD AUTO: 0.03 K/MM3 (ref 0–0.02)
NRBC BLD AUTO-RTO: 1.1 /100 WBC (ref 0–0.2)
PHOSPHATE SERPL-MCNC: 1.1 MG/DL (ref 2.5–4.9)
PLATELET # BLD AUTO: 40 K/MM3 (ref 150–400)
POTASSIUM SERPL-SCNC: 3.2 MMOL/L (ref 3.5–5.5)
SODIUM SERPL-SCNC: 149 MMOL/L (ref 136–145)
TSH SERPL DL<=0.005 MIU/L-ACNC: 2.73 UIU/ML (ref 0.36–4.8)

## 2022-10-19 NOTE — NUR
SHIFT SUMMARY
 
PT HAD A VISIT FROM HER ONCOLOGIST TODAY.  AFTER THE CONSULT, THE FAMILY
DECIDED TO CHANGE THE PT'S STATUS TO DNR AND COMFORT CARE.  THE ORDER CHANGES
ARE IN THE CHART.  PT STATES SHE HAS NO PAIN AT THIS TIME.  SHE IS RESTING
COMFORTABLY AND SLEEPING.  HER  HAS BEEN AT THE BS TODAY.  BED PLACED
IN THE LOWEST POSITION AND CALL LIGHT PLACED WITHIN REACH.

## 2022-10-19 NOTE — NUR
SHIFT SUMMARY
ADMITTED FOR HYPERCALCEMIA. FULL CODE. TELEMETRY: TACHY @120 BPM. PUREWICK IN
PLACE. SHE IS INCONTINENT. BILATERAL PLEURIX DRAINS. MEDIPORT IS ACCESSED. IV
FLUIDS INFUSING AS ORDERED. IV ANTIB RX ARE SCHEDULED. RX CRUSHED IN
APPLESAUCE. 2 LPM O2 VIA NC IS BASELINE. SHE HAS BREAST/LUNG CANCER W/METS TO
LIVER AND KIDNEYS. REGULAR DIET. SHE SEES DR. LOPEZ OUTPT.

## 2022-10-19 NOTE — NUR
Therputicvisit with pt and . They just spoke with dr sue and are
grieving. will continue to monitor and assess and assist with plan.

## 2022-10-20 NOTE — NUR
DR WELLER IN ROOM WITH PATIENT, PATIENT MAKES NEEDS KNOWN, COMFORT CARE ONLY,
POSSIBLE DISCHARGE TODAY WITH HOSPICE, PATIENT IN NO DISTRESS

## 2022-10-20 NOTE — NUR
PT STATUS
PT DID STATE THIS MORNING THAT SHE IS IN PAIN. I DID START OUT WITH THE
SMALLEST DOSE OF ROXANOL, AND WE WILL WORK UP FROM THERE IF REQUIRED. SHE IS
DRINKING WATER. SHE HOLDS A SMALL MOUTHFUL OF IT IN HER MOUTH AND SLOWLY
INGESTS IT SO SHE WON'T CHOKE ON IT.

## 2022-10-20 NOTE — NUR
patient transported via stretcher to home with hospice, removed mediport
access, patient continually educated on the mediport access not being needed
now, clarified with nursing supervisor malia flores and case management

## 2022-10-20 NOTE — NUR
SHIFT SUMMARY
ADMITTED FOR HYPERCALCEMIA. DNR CODE. COMFORT CARE. PLAN IS FOR DC HOME ON
HOSPICE WITH FAMILY. PT HAS HX OF BREAST/LUNG CANCER W/METS TO LIVER &
KIDNEYS. 2 LPM O2 HERE AND AT BASELINE. SHE IS CURRENTLY ON BEDREST. SHE
DENIES PAIN OR DISCOMFORT. NO S/SX OF PAIN ARE DISCERNABLE. MEDIPORT IS
ACCESSED AND INFUSING TKO

## 2023-05-03 NOTE — NUR
AM NOTE:
 
ALERT AND ORIENTED X4. NEURO WNL. OVERALL VERY WEAK. DENIES NUMBNESS/TINGLING.
PERRLA. ABLE TO STAND WITH SBA TO MANAGE LINES AND CORDS TO BSC. ON 2L NASAL
CANNULA O2 AT BASELINE SATING MID 90'S. LUNGS SOUNDING COARSE WITH SOME
CRACKLES IN BASES. BILATERAL PLEURX DRAINS. PULM CONSULT THIS AFTERNOON. THIS
RN DRAINED BOTH PLEURX DRAINS WITH LARGER OUTPUT ON RIGHT AND FLUID LOOKING
SLIGHTLY CLOUDY COMPARED TO LEFT DRAINAGE. LAB SPECIMEN SENT FROM RIGHT
PLEURX DRAIN. STERILE TECHNIQUE USED TO CLEAN AND REDRESS DRAINS. PATIENT
PREMEDICATED WITH PAIN MEDS PRIOR TO DRAINAGE. DENIES PAIN AT THIS TIME. TELE
SHOWING SINUS TACH WITH HR 'S. HR UP 'S WHEN UP TO BSC. BP ON
SOFTER SIDE WITH MAPS ABOVE 65. DENIES DIZZINESS. DENIES ABDOMINAL
PAIN/NAUSEA. VERY POOR APPEATITE. NUTRITION IN TO SPEAK WITH PATIENT THIS AM.
USING BSC TO VOID. GREAT URINE OUTPUT. COMPLAINS OF RIGHT SIDED LUNG PAIN WHEN
BREATHING IN. PAIN IMPROVED WITH PLEURX DRAINAGE. DENIES PAIN MEDS AT THIS
TIME. ECHO TECH IN ROOM.  IN AND UPDATED. ONCOLOGY CONSULT CALLED IN.
MEDIPORT ACCESSED AND TKO NS INFUSING. ANTIBIOTICS INFUSED THIS AM. SPOKE WITH
DR. FERREIRA ON ORDERING HOME MEDS AND HGB LAB LEVELS. MED REC COMPLETED WITH
HELP OF  OVER PHONE. NO NEW ORDERS FOR HGB LAB, LABS ORDERED FOR AM.
CALL LIGHT IN REACH. DENIES NEEDS AT THIS TIME. WILL CONTINUE TO MONITOR.
AM NOTE:
 
PATIENT ALERT AND ORIENTED X4. NEURO AT BASELINE. SBA TO BSC. OVERALL VERY
WEAK. ON 2L NASAL CANNULA SATING ABOVE 95%. LUNGS SOUNDING COARSE WITH SOME
CRACKLES IN BASES. BILATERAL PLEUREX DRAINS. DRESSING C/D/I. DENIES COUGH.
SHALLOW BREATHS. DECREASED PAIN COMPARED TO YESTERDAY DAY SHIFT. SHARP PAIN
WITH DEEP INHILATION. TELE SHOWING SINUS TACH WITH HR 'S. HR UP 'S
WHEN UP TO BEDSIDE COMMODE. MEDICAL STATUS NO TELE THIS AFTERNOON. DENIES
CHEST PAIN/PRESSURE. BP ON SOFTER SIDE. RECIEVEING ONE UNIT OF PRBC AT THIS
TIME. DENIES ABDOMINAL PAIN/NAUSEA. UP TO BSC WITH SBA ASSIST. MEDIPORT WNL.
CALL LIGHT IN REACH. DENIES NEEDS AT THIS TIME. WILL CONTINUE TO MONITOR.
Brief supportive visit this AM. Pt resting in bed and denies pain at this
time. Pt does report pain at times with her respiratory effort. Pt does
reports dyspnea at this time. Pt reports being  and having no children.
She reports spouse is supportive of her needs. Pt reports ability to still get
around the house. Reviewed current plan of care. Ended visit to allow Pt to
rest. Pt agreeable for continued Palliative Care visits.
 
Palliative Care will remain available for supportive and therapeutic visits.
Brief supportive visit this AM. Pt resting in bed and reports mild pain with
respiratory effert that increases with deeper breathes. Pt reports using IS
every 15 minutes. Pt agreeable to work with physical therapy to prevent
decondition if appropriate. Pt reports no new concerns at this time.
 
Spoke with Primary RN Cathy and discussed case.
 
Pt may benefit from PT order to assist with prevention of deconditioning.
 
Palliative Care will remain available.
NIGHT SHIFT SUMMARY
 
ADMITTED FOR PNA. PT IS A DNR. PLAN FOR WEEKS WORTH OF ABX THERAPY FOR MRSA IN
THE PLEUR-X DRAINS. SHE WAS MEDICATED ONCE FOR BACK PAIN WITH IMPROVEMENT. NO
LUNG PAIN REPORTED TONIGHT. THE PATIENT IS A SBA TO THE BEDSIDE COMMODE BUT
REPORTS FEELING WEAK, DESPITE IMPROVEMENT IN ASSISTANCE. MEDIPORT LEFT
INFUSING NS AT TKO. ON 2L BY NC. SHE IS BEING FOLLOWED BY DR LOPEZ AND
PULMONOLOGY.
NIGHT SHIFT SUMMARY
PT HAS BEEN ALERT AND COMMUNICATING APPROPRIATELY W STAFF THIS SHIFT. BP WNL
AND STABLE. TELE SHOWING 'S THIS SHIFT. O2 SATS >90% ON 2L NC. PT HAS
DENIED ANY PAIN OR NAUSEA THIS SHIFT. PT AFEBRILE. PT ABLE TO SLEEP
COMFORTABLY FOR MOST OF THE NIGHT. WILL REPORT TO ONCOMING RN.
NIGHT SHIFT SUMMARY
PT IS ALERT AND COMMUNICATING APPROPRIATELY W STAFF. PT MAINTAINED O2 SATS
>92% ON 2L NC. BP WNL AND STABLE. TELE SHOWING -110'S THIS SHIFT. PT
HAVING PAIN AROUND DRAIN SITES BUT DENIED ANY CP OR NAUSEA THIS SHIFT. PT ABLE
TO SLEEP FOR MOST OF THE NIGHT. PT HAD LOW HGB <7 AND LOW K <3.5 THIS AM SO
PROVIDER CONTACTED AND ORDERS GIVEN. WILL REPORT TO ONCOMING RN.
NIGHT SHIFT SUMMARY
THE PT HAD NO OVERNIGHT EVENTS. O2 SATS >92% ON 2L NC. BP WNL AND STABLE.
PT MEDICATED ONCE FOR PLEURITIC PAIN SHE SAID WAS WORSE W DEEP INSPIRATION. PT
ABLE TO SLEEP MUCH OF THE NIGHT. WILL REPORT TO ONCOMING RN.
PT DISCHARGED
THE PT VERBALIZED UNDERSTANDING OF THE DC INSTRUCTIONS. THE PT PRESCRIPTIONS
FAXED TO [facility] AS REQUESTED. PT WAS REMINDED TO CALL HER PCP'S FOR
A FOLLOW UP APPOINTMENT. THE PT WAS TRANSFERED VIA WHEELCHAIR ACCOMPANIED BY
THE CNA AND HER . PORTABLE OXYGEN WAS APPLIED.
PT SUMMARY:
 
PT HAS BEEN ALERT AND ORIENTED, PLEASANT AND COOPERATIVE FOR THE SHIFT. VITALS
HAS BEEN STABLE, REMAINS ON 2L OF O2 VIA NASAL CANNULA, DESATS WITH ACTIVITY
RECOVERS QUICK. POOR APPETITE DRINKS MOST OF ENSURE WITH MEALS. DENIES ANY
CEHST PAIN OR PLEURITIC CHEST PAIN FOR THE SHIFT, PT HAD A SHOWER TODAY
TOLERATED WELL SBA FOR TRANSFERS, USES BEDSIDE COMMODE FOR TOILETING.
BILATERAL PLEUREX DRAIN REMAINED DRESSED AND IS INTACT PT STATED HER AND THE
 DRAINS AND CHANGED THE DRESSING EVERY 4-5 DAYS LAST TIME IT WAS
DRAINED WAS COUPLE DAYS AGO. PLEURAL DRAIN CULTURE CAME BACK POSITIVE FOR MRSA
INFECTION, PT ON VANCOMYCIN IV, ISOLATION PRECAUTION STARTED. DR PATIÑO AND DR LOPEZ CAME TO SEE PT TODAY PLAN TO KEEP PT FOR ATLEAST COUPLE MORE DAYS FOR
ANTIBIOTIC THERAPY. PT EDUCATED USE AND BENEFITS OF FLUTTER VALVE AND
SPIROMETER PT WAS ABLE TO DEMONSTRATE USE, NO OTHER ISSUES TODAY,  CAME
IN TO VISIT PT UPDATED ABOUT PT'S STATUS.  PT TO TRANSFER TO MEDICAL FLOOR RM
357, REPORT GIVEN TO ESTEFANI ENRIQUEZ, PT TO TRANSFER VIA WHEELCHAIR ACCOMPANIED BY
PCT, ALL BELONGINGS SENT WITH THE PT
Review of plan of care with oncologist and pulmonolgist. Met with patietn
to review symptoms. She is having less pain and air hunger no nausea. Still
feels some fatigue and tired.
   Reviewed getting more care will ask for home health referral to assit with
pleurex and to have PT. Asked Dr hinton if I could start her on incentive
spirometer. trained pt on how to use it and how to so some leg exercise to
keep her stron and mobile. Pt demonstrated understanding or spirometer,
exercise and pain managment.
SHIFT SUMMARY
PATIENT A&OX4. SBA TO BSC. AMUBLATED WITH WALKER TO RESTROOM FOR SHOWER.
BILATERAL PLUER-X DRAINS PRESENT AND COVERED WITH DRESSINGS C/D/I.  RECEIVING
IV ANTIBIOTICS.NO SIGNIFICANT EVENTS. WILL CONTINUE TO MONITOR.
SHIFT SUMMARY
PATIENT A&OX4. SBA TO BSC. ON 2L NC. C/O OF PAIN WHEN BREATHING, MEDICATED X1.
MEDIPORT PRESENT. RECEIVING IV ANTIBIOTICS. PLUERX DRAINS COVERED WITH
DRESSING, C/D/I. VSS. WILL CONTINUE TO MONITOR.
SHIFT SUMMARY
PATIENT TRANSFER FROM PCU. A&OX4. CONTINENT OF URINE PER REPORT. SBA TO BSC.
ON 2L NC.  MEDIPORT PRESENT. NO BP OR BLOOD DRAWS ON LEFT SIDE. PATIENT
ORIENTED TO ROOM.  WILL CONTINUE TO MONITOR.
SHIFT SUMMARY:
 
NO ACUTE CHANGES THROUGHOUT SHIFT. MEDICAL STATUS NO TELE. VITAL SIGNS REMAINS
STABLE.  REPORTS PATIENT HAS HAD SOFTER BLOOD PRESSURE FOR A VERY LONG
TIME. ACTH STIMULATION TEST COMPLETED AND RESULTS DISCUSSED WITH DR. FERREIRA.
PATIENT COMPLAINS OF MODERATE PAIN WHEN DEEP BREATHING. CONTINUALLY REASSESSED
THROUGHOUT SHIFT AND PATIENT DENIED NEED FOR PAIN MEDICATIONS. EATING BETTER
TODAY COMPARED TO PREVIOUS SHIFT. ANTIBIOTCS AND ONE UNIT OF PRBC INFUSED THIS
SHIFT. MEDIPORT FLUSHING AND DRAWING WELL. NS TO TKO INFUSING INTO MEDIPORT.
UP TO BSC WITH SBA. DENIES NEEDS AT THIS TIME. CALL LIGHT IN REACH. WILL
CONTINUE TO MONITOR.
SHIFT SUMMARY:
 
NO ACUTE CHANGES. PATIENT SITTING IN BED AT THIS TIME EATING DINNER. DENIES
NEEDS FOR PAIN MEDICATIONS. NS RUNNING AT TKO. ANTIBIOTICS INFUSED THIS SHIFT.
NO CHANGES IN TELE. REMAINS ON ROOM AIR. VITALS SIGNS REMAINS STABLE. SEE
PREVIOUS NOTE FOR MORE UPDATES. DENIES NEEDS AT THIS TIME. CALL LIGHT IN
REACH. WILL CONTINUE TO MONITOR AND REPORT OFF TO ONCOMING RN.
SHIFT SUMMARY:
 
PT IS A/OX4. ON CONTACT FOR MRSA IN THE BILAT PLEURX DRAINS. SHE IS A SBA TO
THE BSC/BR. SHE HAS NS INFUSING TKO IN THE MEDIPORT. SHE DID NOT HAVE C/O OF
PAIN OR SOB. CALL LIGHT IS WITHIN REACH.
SHIFT SUMMARY: NO ACUTE CHANGES, REPORTED BACK PAIN AT HS AND REQESTED
ROXANOL. MED WAS GIVEN WITH GOOD EFFECT. UP TO THE BSC WITH SBA. MAUNTAINS
SATS ABOVE 90% ON 2L NC. MULTIPLE SOFT FORMED BM'S AND VOIDING AN ORANGE
COLORED URINE. PATIENT IS TACHYCARDIC AT BASELINE UP 'S WITH ACTIVITY
BUT RECOVER TO 'S QUICKLY.
Spoke with Dr Ruvalcaba earlier this AM and discussed case.
 
Pt resting in bed upon arrival. Pt appears dyspneic as evidenced by ability to
speak only in 2-3 word sentences. Pt reports still reports pain with breathing
and states needing to breath shallow to reduce severity of pain. Offered
active listening as Pt reports feeling depressed. She states depression is due
to thinking that she is not getting any better. Listened as she reports
changing her code status to DNR this AM but has not told her  yet as he
is driving a friend of theirs to [place] for an appointment. Continued
supportive listening and validated concerns. Pt also reports anxiety. Offered
suggestions such as distraction techniques to assist with anxiety. Pt
expresses appreciation of visit and is agreeable for continued Palliative Care
visits.
 
Spoke with Primary RN Jody and discussed case.
 
Palliative Care will remain available for supportive and therapeutic visits.
normal...